# Patient Record
Sex: MALE | Race: BLACK OR AFRICAN AMERICAN | NOT HISPANIC OR LATINO | URBAN - METROPOLITAN AREA
[De-identification: names, ages, dates, MRNs, and addresses within clinical notes are randomized per-mention and may not be internally consistent; named-entity substitution may affect disease eponyms.]

---

## 2019-04-13 ENCOUNTER — INPATIENT (INPATIENT)
Facility: HOSPITAL | Age: 84
LOS: 3 days | Discharge: HOME | DRG: 202 | End: 2019-04-17
Attending: STUDENT IN AN ORGANIZED HEALTH CARE EDUCATION/TRAINING PROGRAM | Admitting: STUDENT IN AN ORGANIZED HEALTH CARE EDUCATION/TRAINING PROGRAM
Payer: MEDICARE

## 2019-04-13 VITALS
DIASTOLIC BLOOD PRESSURE: 83 MMHG | HEART RATE: 89 BPM | SYSTOLIC BLOOD PRESSURE: 151 MMHG | OXYGEN SATURATION: 94 % | TEMPERATURE: 98 F | RESPIRATION RATE: 23 BRPM

## 2019-04-13 DIAGNOSIS — G80.9 CEREBRAL PALSY, UNSPECIFIED: ICD-10-CM

## 2019-04-13 DIAGNOSIS — L89.90 PRESSURE ULCER OF UNSPECIFIED SITE, UNSPECIFIED STAGE: ICD-10-CM

## 2019-04-13 DIAGNOSIS — Z91.89 OTHER SPECIFIED PERSONAL RISK FACTORS, NOT ELSEWHERE CLASSIFIED: ICD-10-CM

## 2019-04-13 DIAGNOSIS — D50.9 IRON DEFICIENCY ANEMIA, UNSPECIFIED: ICD-10-CM

## 2019-04-13 DIAGNOSIS — R63.8 OTHER SYMPTOMS AND SIGNS CONCERNING FOOD AND FLUID INTAKE: ICD-10-CM

## 2019-04-13 DIAGNOSIS — I10 ESSENTIAL (PRIMARY) HYPERTENSION: ICD-10-CM

## 2019-04-13 DIAGNOSIS — J45.21 MILD INTERMITTENT ASTHMA WITH (ACUTE) EXACERBATION: ICD-10-CM

## 2019-04-13 LAB
ALBUMIN SERPL ELPH-MCNC: 3.3 G/DL — SIGNIFICANT CHANGE UP (ref 3.3–5)
ALP SERPL-CCNC: 81 U/L — SIGNIFICANT CHANGE UP (ref 40–120)
ALT FLD-CCNC: 10 U/L — SIGNIFICANT CHANGE UP (ref 10–45)
ANION GAP SERPL CALC-SCNC: 12 MMOL/L — SIGNIFICANT CHANGE UP (ref 5–17)
APTT BLD: 33.6 SEC — SIGNIFICANT CHANGE UP (ref 27.5–36.3)
AST SERPL-CCNC: 16 U/L — SIGNIFICANT CHANGE UP (ref 10–40)
BASE EXCESS BLDV CALC-SCNC: 3.8 MMOL/L — SIGNIFICANT CHANGE UP
BASOPHILS # BLD AUTO: 0.03 K/UL — SIGNIFICANT CHANGE UP (ref 0–0.2)
BASOPHILS NFR BLD AUTO: 0.4 % — SIGNIFICANT CHANGE UP (ref 0–2)
BILIRUB SERPL-MCNC: 0.3 MG/DL — SIGNIFICANT CHANGE UP (ref 0.2–1.2)
BUN SERPL-MCNC: 20 MG/DL — SIGNIFICANT CHANGE UP (ref 7–23)
CALCIUM SERPL-MCNC: 9.2 MG/DL — SIGNIFICANT CHANGE UP (ref 8.4–10.5)
CHLORIDE SERPL-SCNC: 104 MMOL/L — SIGNIFICANT CHANGE UP (ref 96–108)
CO2 SERPL-SCNC: 26 MMOL/L — SIGNIFICANT CHANGE UP (ref 22–31)
CREAT SERPL-MCNC: 0.46 MG/DL — LOW (ref 0.5–1.3)
EOSINOPHIL # BLD AUTO: 0.2 K/UL — SIGNIFICANT CHANGE UP (ref 0–0.5)
EOSINOPHIL NFR BLD AUTO: 2.4 % — SIGNIFICANT CHANGE UP (ref 0–6)
FERRITIN SERPL-MCNC: 104 NG/ML — SIGNIFICANT CHANGE UP (ref 30–400)
GAS PNL BLDV: SIGNIFICANT CHANGE UP
GLUCOSE SERPL-MCNC: 95 MG/DL — SIGNIFICANT CHANGE UP (ref 70–99)
HCO3 BLDV-SCNC: 31 MMOL/L — HIGH (ref 20–27)
HCT VFR BLD CALC: 37.3 % — LOW (ref 39–50)
HGB BLD-MCNC: 11.6 G/DL — LOW (ref 13–17)
IMM GRANULOCYTES NFR BLD AUTO: 0.2 % — SIGNIFICANT CHANGE UP (ref 0–1.5)
INR BLD: 1.16 — SIGNIFICANT CHANGE UP (ref 0.88–1.16)
IRON SATN MFR SERPL: 15 UG/DL — LOW (ref 45–165)
IRON SATN MFR SERPL: 7 % — LOW (ref 16–55)
LYMPHOCYTES # BLD AUTO: 1.54 K/UL — SIGNIFICANT CHANGE UP (ref 1–3.3)
LYMPHOCYTES # BLD AUTO: 18.7 % — SIGNIFICANT CHANGE UP (ref 13–44)
MAGNESIUM SERPL-MCNC: 2 MG/DL — SIGNIFICANT CHANGE UP (ref 1.6–2.6)
MCHC RBC-ENTMCNC: 25.8 PG — LOW (ref 27–34)
MCHC RBC-ENTMCNC: 31.1 GM/DL — LOW (ref 32–36)
MCV RBC AUTO: 83.1 FL — SIGNIFICANT CHANGE UP (ref 80–100)
MONOCYTES # BLD AUTO: 0.71 K/UL — SIGNIFICANT CHANGE UP (ref 0–0.9)
MONOCYTES NFR BLD AUTO: 8.6 % — SIGNIFICANT CHANGE UP (ref 2–14)
NEUTROPHILS # BLD AUTO: 5.74 K/UL — SIGNIFICANT CHANGE UP (ref 1.8–7.4)
NEUTROPHILS NFR BLD AUTO: 69.7 % — SIGNIFICANT CHANGE UP (ref 43–77)
NRBC # BLD: 0 /100 WBCS — SIGNIFICANT CHANGE UP (ref 0–0)
NT-PROBNP SERPL-SCNC: 148 PG/ML — SIGNIFICANT CHANGE UP (ref 0–300)
PCO2 BLDV: 60 MMHG — HIGH (ref 41–51)
PH BLDV: 7.33 — SIGNIFICANT CHANGE UP (ref 7.32–7.43)
PLATELET # BLD AUTO: 210 K/UL — SIGNIFICANT CHANGE UP (ref 150–400)
PO2 BLDV: 41 MMHG — SIGNIFICANT CHANGE UP
POTASSIUM SERPL-MCNC: 3.6 MMOL/L — SIGNIFICANT CHANGE UP (ref 3.5–5.3)
POTASSIUM SERPL-SCNC: 3.6 MMOL/L — SIGNIFICANT CHANGE UP (ref 3.5–5.3)
PROT SERPL-MCNC: 7.5 G/DL — SIGNIFICANT CHANGE UP (ref 6–8.3)
PROTHROM AB SERPL-ACNC: 13.2 SEC — HIGH (ref 10–12.9)
RBC # BLD: 4.49 M/UL — SIGNIFICANT CHANGE UP (ref 4.2–5.8)
RBC # FLD: 17.2 % — HIGH (ref 10.3–14.5)
SAO2 % BLDV: 71 % — SIGNIFICANT CHANGE UP
SODIUM SERPL-SCNC: 142 MMOL/L — SIGNIFICANT CHANGE UP (ref 135–145)
TIBC SERPL-MCNC: 218 UG/DL — LOW (ref 220–430)
TROPONIN T SERPL-MCNC: 0.03 NG/ML — HIGH (ref 0–0.01)
TROPONIN T SERPL-MCNC: 0.04 NG/ML — CRITICAL HIGH (ref 0–0.01)
UIBC SERPL-MCNC: 203 UG/DL — SIGNIFICANT CHANGE UP (ref 110–370)
WBC # BLD: 8.24 K/UL — SIGNIFICANT CHANGE UP (ref 3.8–10.5)
WBC # FLD AUTO: 8.24 K/UL — SIGNIFICANT CHANGE UP (ref 3.8–10.5)

## 2019-04-13 PROCEDURE — 71045 X-RAY EXAM CHEST 1 VIEW: CPT | Mod: 26

## 2019-04-13 PROCEDURE — 99285 EMERGENCY DEPT VISIT HI MDM: CPT | Mod: 25

## 2019-04-13 PROCEDURE — 99223 1ST HOSP IP/OBS HIGH 75: CPT | Mod: GC

## 2019-04-13 PROCEDURE — 93010 ELECTROCARDIOGRAM REPORT: CPT

## 2019-04-13 RX ORDER — IPRATROPIUM/ALBUTEROL SULFATE 18-103MCG
3 AEROSOL WITH ADAPTER (GRAM) INHALATION EVERY 6 HOURS
Qty: 0 | Refills: 0 | Status: DISCONTINUED | OUTPATIENT
Start: 2019-04-13 | End: 2019-04-16

## 2019-04-13 RX ORDER — EPINEPHRINE 0.3 MG/.3ML
3 INJECTION INTRAMUSCULAR; SUBCUTANEOUS
Qty: 0 | Refills: 0 | COMMUNITY

## 2019-04-13 RX ORDER — IPRATROPIUM BROMIDE 0.2 MG/ML
0 SOLUTION, NON-ORAL INHALATION
Qty: 0 | Refills: 0 | COMMUNITY

## 2019-04-13 RX ORDER — IPRATROPIUM/ALBUTEROL SULFATE 18-103MCG
3 AEROSOL WITH ADAPTER (GRAM) INHALATION
Qty: 0 | Refills: 0 | Status: COMPLETED | OUTPATIENT
Start: 2019-04-13 | End: 2019-04-13

## 2019-04-13 RX ORDER — HEPARIN SODIUM 5000 [USP'U]/ML
5000 INJECTION INTRAVENOUS; SUBCUTANEOUS EVERY 8 HOURS
Qty: 0 | Refills: 0 | Status: DISCONTINUED | OUTPATIENT
Start: 2019-04-13 | End: 2019-04-17

## 2019-04-13 RX ORDER — FERROUS SULFATE 325(65) MG
325 TABLET ORAL EVERY 8 HOURS
Qty: 0 | Refills: 0 | Status: DISCONTINUED | OUTPATIENT
Start: 2019-04-13 | End: 2019-04-17

## 2019-04-13 RX ORDER — ASPIRIN/CALCIUM CARB/MAGNESIUM 324 MG
81 TABLET ORAL DAILY
Qty: 0 | Refills: 0 | Status: DISCONTINUED | OUTPATIENT
Start: 2019-04-13 | End: 2019-04-17

## 2019-04-13 RX ORDER — FERROUS SULFATE 325(65) MG
325 TABLET ORAL DAILY
Qty: 0 | Refills: 0 | Status: DISCONTINUED | OUTPATIENT
Start: 2019-04-13 | End: 2019-04-13

## 2019-04-13 RX ORDER — AMLODIPINE BESYLATE 2.5 MG/1
5 TABLET ORAL DAILY
Qty: 0 | Refills: 0 | Status: DISCONTINUED | OUTPATIENT
Start: 2019-04-13 | End: 2019-04-17

## 2019-04-13 RX ORDER — ASPIRIN/CALCIUM CARB/MAGNESIUM 324 MG
1 TABLET ORAL
Qty: 0 | Refills: 0 | COMMUNITY

## 2019-04-13 RX ORDER — ASCORBIC ACID 60 MG
1 TABLET,CHEWABLE ORAL
Qty: 0 | Refills: 0 | COMMUNITY

## 2019-04-13 RX ADMIN — HEPARIN SODIUM 5000 UNIT(S): 5000 INJECTION INTRAVENOUS; SUBCUTANEOUS at 22:03

## 2019-04-13 RX ADMIN — Medication 325 MILLIGRAM(S): at 11:02

## 2019-04-13 RX ADMIN — Medication 3 MILLILITER(S): at 11:02

## 2019-04-13 RX ADMIN — Medication 325 MILLIGRAM(S): at 17:22

## 2019-04-13 RX ADMIN — Medication 3 MILLILITER(S): at 22:01

## 2019-04-13 RX ADMIN — AMLODIPINE BESYLATE 5 MILLIGRAM(S): 2.5 TABLET ORAL at 22:02

## 2019-04-13 RX ADMIN — Medication 3 MILLILITER(S): at 17:28

## 2019-04-13 RX ADMIN — Medication 3 MILLILITER(S): at 05:36

## 2019-04-13 RX ADMIN — Medication 3 MILLILITER(S): at 06:22

## 2019-04-13 RX ADMIN — Medication 325 MILLIGRAM(S): at 22:02

## 2019-04-13 RX ADMIN — Medication 81 MILLIGRAM(S): at 11:02

## 2019-04-13 RX ADMIN — Medication 1 TABLET(S): at 11:02

## 2019-04-13 RX ADMIN — Medication 125 MILLIGRAM(S): at 05:39

## 2019-04-13 RX ADMIN — HEPARIN SODIUM 5000 UNIT(S): 5000 INJECTION INTRAVENOUS; SUBCUTANEOUS at 13:19

## 2019-04-13 NOTE — H&P ADULT - ASSESSMENT
84 yo M with h/o as above presenting with c/o SOB for the last few days after running out of asthma medications.   Admitted to medicine for Asthma exacerbation 2/2 running out of medicatiosn. 82 yo M with h/o as above presenting with c/o SOB for the last few days after running out of asthma medications.   Admitted to medicine for Asthma exacerbation 2/2 running out of medication.

## 2019-04-13 NOTE — H&P ADULT - PROBLEM SELECTOR PLAN 1
Pt with h/o Asthma/COPD overlap presenting with c/o SOB after running out of bronchodilators.   -received duonebs with Solumedrol and was placed on Bipap.  -Duonebs standing and PRN  -Bipap at night time.

## 2019-04-13 NOTE — ED ADULT NURSE NOTE - OBJECTIVE STATEMENT
Presents to ED for SOB.  Patient was brought in by his sister who reports she was concerned by his breathing.  She reports he ran out of his albuterol/atrovent one day ago.  Patient wears CPAP at night per sister, and has been placed on BiPAP in the ED.  She states he has had a cough but denies any fevers, chills, sick contacts.  Patient is nonverbal but nods appropriately to questions asked.

## 2019-04-13 NOTE — ED PROVIDER NOTE - OBJECTIVE STATEMENT
83M hx CP, dementia, htn, copd/asthma, brought in by sister for SOB. states ran out of albuterol/atrovent a day ago.  +wheezing, increased work of breathing.  uses bipap at night.  +cough. no fevers. no vomiting. pt nonverbal at baseline.

## 2019-04-13 NOTE — H&P ADULT - HISTORY OF PRESENT ILLNESS
84 yo M with h/o CP, Dementia, HTN, COPD/Asthma presents with 82 yo M with h/o CP, Dementia, HTN, COPD/Asthma presenting with sister with c/o SOB for the last few days after running out of Albuterol/atrovent at home.  As per sister he was wheezy and was exhibiting signs of increased WOB. Pt at home uses Bipap at night. Also endorsing cough.   Pt has h/o intubations in the past, last time was mid 2018. Pt used to be a smoker (how much sister does not know).  Sister denying fevers.     Pt ROS limited: Denies CP, palpitations, abdominal pain, blurry vision.   Pt is DNR/DNI. As per sister is being transfered to a facility in [place], [place] on the 29th.

## 2019-04-13 NOTE — ED ADULT TRIAGE NOTE - ARRIVAL INFO ADDITIONAL COMMENTS
Pt Brought in by wheelchair with sister, AAA+O person and place. He is unable to speak but can mouth his words, states he is short of breath, chest tightness using his accesory muscles. Onset was last night after he finished his last asthma nebulizer. Hx of COPD and asthma. He uses bipap every night. Sister denies fever, chills, cough. HX of dementia, cerebral palsy, HTN. Hx of

## 2019-04-13 NOTE — H&P ADULT - NSICDXPASTMEDICALHX_GEN_ALL_CORE_FT
PAST MEDICAL HISTORY:  Asthma     Cerebral palsy     COPD (chronic obstructive pulmonary disease)     HTN (hypertension)

## 2019-04-13 NOTE — H&P ADULT - ATTENDING COMMENTS
SOB due to running out of albuterol inhaler, not COPD or Asthma exacerbation  C/w nebs ATC for now, no need to continue with steroids  PT eval, anticipate dc home tomorrow/day after  Rest as above

## 2019-04-13 NOTE — ED PROVIDER NOTE - PROGRESS NOTE DETAILS
breathing improved on bipap, likely asthma/copd exacerbatino, will admit to medicine breathing improved on bipap, likely asthma/copd exacerbation, will admit to medicine.  elevated troponin, however no acute st/t wave changes on EKG, likely demand ischemia

## 2019-04-13 NOTE — ED PROVIDER NOTE - PMH
Cerebral palsy    COPD (chronic obstructive pulmonary disease)    HTN (hypertension) Asthma    Cerebral palsy    COPD (chronic obstructive pulmonary disease)    HTN (hypertension)

## 2019-04-13 NOTE — H&P ADULT - NSHPLABSRESULTS_GEN_ALL_CORE
.  LABS:                         11.6   8.24  )-----------( 210      ( 13 Apr 2019 05:36 )             37.3     04-13    142  |  104  |  20  ----------------------------<  95  3.6   |  26  |  0.46<L>    Ca    9.2      13 Apr 2019 05:36  Mg     2.0     04-13    TPro  7.5  /  Alb  3.3  /  TBili  0.3  /  DBili  x   /  AST  16  /  ALT  10  /  AlkPhos  81  04-13    PT/INR - ( 13 Apr 2019 05:36 )   PT: 13.2 sec;   INR: 1.16          PTT - ( 13 Apr 2019 05:36 )  PTT:33.6 sec    CARDIAC MARKERS ( 13 Apr 2019 10:32 )  x     / 0.03 ng/mL / x     / x     / x      CARDIAC MARKERS ( 13 Apr 2019 05:36 )  x     / 0.04 ng/mL / x     / x     / x          Serum Pro-Brain Natriuretic Peptide: 148 pg/mL (04-13 @ 05:36)        RADIOLOGY, EKG & ADDITIONAL TESTS: Reviewed.

## 2019-04-13 NOTE — H&P ADULT - NSHPPHYSICALEXAM_GEN_ALL_CORE
.  VITAL SIGNS:  T(C): 36.7 (04-13-19 @ 14:15), Max: 37 (04-13-19 @ 07:18)  T(F): 98.1 (04-13-19 @ 14:15), Max: 98.6 (04-13-19 @ 07:18)  HR: 90 (04-13-19 @ 14:15) (77 - 90)  BP: 120/69 (04-13-19 @ 14:15) (120/69 - 151/83)  BP(mean): --  RR: 17 (04-13-19 @ 14:15) (17 - 23)  SpO2: 100% (04-13-19 @ 14:15) (94% - 100%)  Wt(kg): --    PHYSICAL EXAM:    Constitutional: WDWN resting comfortably in bed; NAD  Head: NC/AT  Eyes: PERRL, EOMI, anicteric sclera  ENT: no nasal discharge; uvula midline, no oropharyngeal erythema or exudates; MMM  Neck: supple; no JVD or thyromegaly  Respiratory: Good air entry b/l , mild wheezes appreciated.   Cardiac: +S1/S2; RRR; no M/R/G; PMI non-displaced  Gastrointestinal: abdomen soft, NT/ND; no rebound or guarding; +BSx4  Genitourinary: normal external genitalia  Back: spine midline, no bony tenderness or step-offs; no CVAT B/L  Extremities: WWP, no clubbing or cyanosis; no peripheral edema, b/l Pressure ulcers, Rt knee also with ulcer, no discharge or redness.   Musculoskeletal: NROM x4; no joint swelling, tenderness or erythema  Vascular: 2+ radial, femoral, DP/PT pulses B/L  Dermatologic: Stage 3 sacral ulcer  Neurologic: AAOx1; CNII-XII grossly intact; no focal deficits

## 2019-04-14 LAB
ANION GAP SERPL CALC-SCNC: 8 MMOL/L — SIGNIFICANT CHANGE UP (ref 5–17)
BASOPHILS # BLD AUTO: 0.02 K/UL — SIGNIFICANT CHANGE UP (ref 0–0.2)
BASOPHILS NFR BLD AUTO: 0.2 % — SIGNIFICANT CHANGE UP (ref 0–2)
BUN SERPL-MCNC: 23 MG/DL — SIGNIFICANT CHANGE UP (ref 7–23)
CALCIUM SERPL-MCNC: 8.9 MG/DL — SIGNIFICANT CHANGE UP (ref 8.4–10.5)
CHLORIDE SERPL-SCNC: 107 MMOL/L — SIGNIFICANT CHANGE UP (ref 96–108)
CO2 SERPL-SCNC: 27 MMOL/L — SIGNIFICANT CHANGE UP (ref 22–31)
CREAT SERPL-MCNC: 0.55 MG/DL — SIGNIFICANT CHANGE UP (ref 0.5–1.3)
EOSINOPHIL # BLD AUTO: 0.05 K/UL — SIGNIFICANT CHANGE UP (ref 0–0.5)
EOSINOPHIL NFR BLD AUTO: 0.6 % — SIGNIFICANT CHANGE UP (ref 0–6)
GLUCOSE SERPL-MCNC: 83 MG/DL — SIGNIFICANT CHANGE UP (ref 70–99)
HCT VFR BLD CALC: 31.3 % — LOW (ref 39–50)
HGB BLD-MCNC: 9.7 G/DL — LOW (ref 13–17)
IMM GRANULOCYTES NFR BLD AUTO: 0.3 % — SIGNIFICANT CHANGE UP (ref 0–1.5)
LYMPHOCYTES # BLD AUTO: 1.1 K/UL — SIGNIFICANT CHANGE UP (ref 1–3.3)
LYMPHOCYTES # BLD AUTO: 12.8 % — LOW (ref 13–44)
MAGNESIUM SERPL-MCNC: 1.9 MG/DL — SIGNIFICANT CHANGE UP (ref 1.6–2.6)
MCHC RBC-ENTMCNC: 25.7 PG — LOW (ref 27–34)
MCHC RBC-ENTMCNC: 31 GM/DL — LOW (ref 32–36)
MCV RBC AUTO: 83 FL — SIGNIFICANT CHANGE UP (ref 80–100)
MONOCYTES # BLD AUTO: 0.63 K/UL — SIGNIFICANT CHANGE UP (ref 0–0.9)
MONOCYTES NFR BLD AUTO: 7.3 % — SIGNIFICANT CHANGE UP (ref 2–14)
NEUTROPHILS # BLD AUTO: 6.76 K/UL — SIGNIFICANT CHANGE UP (ref 1.8–7.4)
NEUTROPHILS NFR BLD AUTO: 78.8 % — HIGH (ref 43–77)
NRBC # BLD: 0 /100 WBCS — SIGNIFICANT CHANGE UP (ref 0–0)
PHOSPHATE SERPL-MCNC: 3.1 MG/DL — SIGNIFICANT CHANGE UP (ref 2.5–4.5)
PLATELET # BLD AUTO: 164 K/UL — SIGNIFICANT CHANGE UP (ref 150–400)
POTASSIUM SERPL-MCNC: 4.1 MMOL/L — SIGNIFICANT CHANGE UP (ref 3.5–5.3)
POTASSIUM SERPL-SCNC: 4.1 MMOL/L — SIGNIFICANT CHANGE UP (ref 3.5–5.3)
RBC # BLD: 3.77 M/UL — LOW (ref 4.2–5.8)
RBC # FLD: 17.2 % — HIGH (ref 10.3–14.5)
SODIUM SERPL-SCNC: 142 MMOL/L — SIGNIFICANT CHANGE UP (ref 135–145)
WBC # BLD: 8.59 K/UL — SIGNIFICANT CHANGE UP (ref 3.8–10.5)
WBC # FLD AUTO: 8.59 K/UL — SIGNIFICANT CHANGE UP (ref 3.8–10.5)

## 2019-04-14 PROCEDURE — 71045 X-RAY EXAM CHEST 1 VIEW: CPT | Mod: 26

## 2019-04-14 PROCEDURE — 99233 SBSQ HOSP IP/OBS HIGH 50: CPT

## 2019-04-14 RX ORDER — MAGNESIUM SULFATE 500 MG/ML
2 VIAL (ML) INJECTION ONCE
Qty: 0 | Refills: 0 | Status: COMPLETED | OUTPATIENT
Start: 2019-04-14 | End: 2019-04-14

## 2019-04-14 RX ORDER — IPRATROPIUM/ALBUTEROL SULFATE 18-103MCG
3 AEROSOL WITH ADAPTER (GRAM) INHALATION ONCE
Qty: 0 | Refills: 0 | Status: COMPLETED | OUTPATIENT
Start: 2019-04-14 | End: 2019-04-14

## 2019-04-14 RX ADMIN — HEPARIN SODIUM 5000 UNIT(S): 5000 INJECTION INTRAVENOUS; SUBCUTANEOUS at 06:45

## 2019-04-14 RX ADMIN — HEPARIN SODIUM 5000 UNIT(S): 5000 INJECTION INTRAVENOUS; SUBCUTANEOUS at 14:13

## 2019-04-14 RX ADMIN — AMLODIPINE BESYLATE 5 MILLIGRAM(S): 2.5 TABLET ORAL at 06:45

## 2019-04-14 RX ADMIN — Medication 40 MILLIGRAM(S): at 10:05

## 2019-04-14 RX ADMIN — Medication 3 MILLILITER(S): at 03:16

## 2019-04-14 RX ADMIN — Medication 3 MILLILITER(S): at 22:59

## 2019-04-14 RX ADMIN — Medication 3 MILLILITER(S): at 10:05

## 2019-04-14 RX ADMIN — Medication 325 MILLIGRAM(S): at 23:04

## 2019-04-14 RX ADMIN — Medication 3 MILLILITER(S): at 09:47

## 2019-04-14 RX ADMIN — Medication 3 MILLILITER(S): at 14:32

## 2019-04-14 RX ADMIN — Medication 325 MILLIGRAM(S): at 14:12

## 2019-04-14 RX ADMIN — Medication 1 TABLET(S): at 14:12

## 2019-04-14 RX ADMIN — Medication 325 MILLIGRAM(S): at 06:45

## 2019-04-14 RX ADMIN — Medication 81 MILLIGRAM(S): at 14:12

## 2019-04-14 RX ADMIN — HEPARIN SODIUM 5000 UNIT(S): 5000 INJECTION INTRAVENOUS; SUBCUTANEOUS at 23:04

## 2019-04-14 RX ADMIN — Medication 150 GRAM(S): at 10:05

## 2019-04-14 RX ADMIN — Medication 3 MILLILITER(S): at 18:44

## 2019-04-14 NOTE — PHYSICAL THERAPY INITIAL EVALUATION ADULT - ADDITIONAL COMMENTS
Pt is cared for by his sister. As per notes, pt will be transferred to a facility in [place] in ~2 weeks.

## 2019-04-14 NOTE — PHYSICAL THERAPY INITIAL EVALUATION ADULT - ACTIVE RANGE OF MOTION EXAMINATION, REHAB EVAL
bilateral upper extremity Active ROM was WFL (within functional limits)/pt is unable to move his BLE due to spasticity - pt is able to minimally wiggle his toes

## 2019-04-14 NOTE — DIETITIAN INITIAL EVALUATION ADULT. - ENERGY NEEDS
Height: 5'2" Weight: 118lbs, IBW 5'2lbs+/-10%, %IBW 99%, BMI 21.5  ABW used for calculations as pt between % of IBW.   Nutrient needs based on [facility] standards of care for maintenance in older adults.   Needs adjusted for age, stage 3 pressure ulcer, limited mobility 2/2 cerebral palsy

## 2019-04-14 NOTE — PROGRESS NOTE ADULT - SUBJECTIVE AND OBJECTIVE BOX
Patient is a 83y old  Male who presents with a chief complaint of SOB (2019 10:25)      INTERVAL HPI/OVERNIGHT EVENTS:  Seen this afternoon. Events noted: pt was wheezing towards the end when patient was working with PT. Given solumedrol and nebs with improvement of symptoms.  Sister at bedside at time of visit. Patient stated feeling a bit better.    Review of Systems: 12 point review of systems otherwise negative    MEDICATIONS  (STANDING):  ALBUTerol/ipratropium for Nebulization 3 milliLiter(s) Nebulizer every 6 hours  amLODIPine   Tablet 5 milliGRAM(s) Oral daily  aspirin enteric coated 81 milliGRAM(s) Oral daily  ferrous    sulfate 325 milliGRAM(s) Oral every 8 hours  heparin  Injectable 5000 Unit(s) SubCutaneous every 8 hours  multivitamin 1 Tablet(s) Oral daily    MEDICATIONS  (PRN):  ALBUTerol/ipratropium for Nebulization 3 milliLiter(s) Nebulizer every 6 hours PRN Shortness of Breath and/or Wheezing      Allergies    Spiriva (Anaphylaxis)    Intolerances          Vital Signs Last 24 Hrs  T(C): 36.7 (2019 09:07), Max: 36.7 (2019 20:30)  T(F): 98 (2019 09:07), Max: 98.1 (2019 20:30)  HR: 99 (2019 09:07) (69 - 99)  BP: 164/96 (2019 09:07) (128/70 - 164/96)  BP(mean): --  RR: 18 (2019 09:07) (16 - 18)  SpO2: 97% (2019 09:07) (97% - 100%)  CAPILLARY BLOOD GLUCOSE            Physical Exam:    Daily     Daily Weight in k (2019 14:12)  General:  Well appearing, NAD, not cachetic  HEENT:  Nonicteric, PERRLA  CV:  RRR, no murmur, no JVD  Lungs:  Minimal wheezes on exam  Abdomen:  Soft, non-tender, no distended, positive BS, no hepatosplenomegaly  Extremities:  2+ pulses, no c/c, no edema  Skin:  Warm and dry, no rashes  :  No lea  Neuro:  Non focal  No Restraints    LABS:                        9.7    8.59  )-----------( 164      ( 2019 06:39 )             31.3     04-    142  |  107  |  23  ----------------------------<  83  4.1   |  27  |  0.55    Ca    8.9      2019 06:39  Phos  3.1     -  Mg     1.9         TPro  7.5  /  Alb  3.3  /  TBili  0.3  /  DBili  x   /  AST  16  /  ALT  10  /  AlkPhos  81  -13    PT/INR - ( 2019 05:36 )   PT: 13.2 sec;   INR: 1.16          PTT - ( 2019 05:36 )  PTT:33.6 sec        RADIOLOGY & ADDITIONAL TESTS:

## 2019-04-14 NOTE — DIETITIAN INITIAL EVALUATION ADULT. - NS AS NUTRI INTERV MEALS SNACK
General/healthful diet/Recommend Pureed CSTCHO, DASH/TLC, w/ Ensure Enlives BID (700kcal, 40g pro). Consider switching to glucerna if blood sugar not well controlled.

## 2019-04-14 NOTE — DIETITIAN INITIAL EVALUATION ADULT. - PERTINENT LABORATORY DATA
Patient taking any blood thinners ? No     Heart disease ? Denies     Lung disease ? Asthma. Advised patient to bring inhaler      Sleep apnea ? Denies     Diabetic ? Denies     Kidney disease ? Denies     Dialysis ? N/a     Electronic implanted medical devices ? Denies     Are you taking any narcotic pain medication ? Patient taking Suboxone  What is your daily dosage ?    PTSD ? N/a     Prep instructions reviewed with patient ? Patient declined review, has had exam before.  policy, [facility] [place] [facility] reviewed. Advised patient to have someone stay with her post exam    Pharmacy : [facility] called to [facility]    Indication for procedure : Screening colonoscopy    Referring provider :Sarah Montemayor APRN CNP on 09/10/18 0910     Arrival Time : 2 PM     TIBC 218, %iron sat 7, total iron 5

## 2019-04-14 NOTE — DIETITIAN INITIAL EVALUATION ADULT. - OTHER INFO
84yo M with h/o as above presenting with c/o SOB for the last few days after running out of asthma medications. Per MD, SOB is d/t running out of albuterol inhalor, not COPD or asthma exacerbation. Pt seen in room, asleep on BiPAP. Currently on a mechanical soft diet, DASH/TLC, CSTCHO diet. Tolerated scrambled eggs for breakfast. Per sister at bedside, pt only has pureed foods at home, when I lifted cover on lunch tray, she stated mechanical soft is not appropriate for him and that he would aspirate. D/w team switching over the pureed. Reports pt has 2 Ensure Original drinks/day at home (440kcal, 18g pro total). Takes vitamin C daily. No apparent GI distress, no N/V, having regular BMs. Endorsed good appetite PTA and w/ breakfast this am. D/w pt's sister increased protein needs d/t stage 3 pressure ulcer. Encouraged pureed protein options (egg salad, tuna fish salad, bean/lentil pureed soups, greek yogurts...etc). NKFA or dietary restrictions. Denies adhering to any therapeutic diet at home. Skin: stage III pressure ulcer on R buttock, GI: fecal incontinence. RD to follow.

## 2019-04-14 NOTE — PROGRESS NOTE ADULT - PROBLEM SELECTOR PLAN 1
Pt with h/o Asthma/COPD overlap presenting with c/o SOB after running out of bronchodilators.   No definite e/o exacerbation but given today's episode will c/w steroids and c/w nebs ATC. Prednisone to be continued for total of 5 days.  BIPAP at bedside, prn/night time.  Wean off oxygen as tolerated.

## 2019-04-14 NOTE — PHYSICAL THERAPY INITIAL EVALUATION ADULT - PERTINENT HX OF CURRENT PROBLEM, REHAB EVAL
82 yo M with h/o as above presenting with c/o SOB for the last few days after running out of asthma medications.

## 2019-04-15 LAB
ANION GAP SERPL CALC-SCNC: 4 MMOL/L — LOW (ref 5–17)
BUN SERPL-MCNC: 25 MG/DL — HIGH (ref 7–23)
CALCIUM SERPL-MCNC: 9 MG/DL — SIGNIFICANT CHANGE UP (ref 8.4–10.5)
CHLORIDE SERPL-SCNC: 108 MMOL/L — SIGNIFICANT CHANGE UP (ref 96–108)
CO2 SERPL-SCNC: 30 MMOL/L — SIGNIFICANT CHANGE UP (ref 22–31)
CREAT SERPL-MCNC: 0.39 MG/DL — LOW (ref 0.5–1.3)
GLUCOSE SERPL-MCNC: 126 MG/DL — HIGH (ref 70–99)
HCT VFR BLD CALC: 30.5 % — LOW (ref 39–50)
HGB BLD-MCNC: 9.4 G/DL — LOW (ref 13–17)
MAGNESIUM SERPL-MCNC: 2.2 MG/DL — SIGNIFICANT CHANGE UP (ref 1.6–2.6)
MCHC RBC-ENTMCNC: 25.5 PG — LOW (ref 27–34)
MCHC RBC-ENTMCNC: 30.8 GM/DL — LOW (ref 32–36)
MCV RBC AUTO: 82.9 FL — SIGNIFICANT CHANGE UP (ref 80–100)
NRBC # BLD: 0 /100 WBCS — SIGNIFICANT CHANGE UP (ref 0–0)
PLATELET # BLD AUTO: 175 K/UL — SIGNIFICANT CHANGE UP (ref 150–400)
POTASSIUM SERPL-MCNC: 5.1 MMOL/L — SIGNIFICANT CHANGE UP (ref 3.5–5.3)
POTASSIUM SERPL-SCNC: 5.1 MMOL/L — SIGNIFICANT CHANGE UP (ref 3.5–5.3)
RBC # BLD: 3.68 M/UL — LOW (ref 4.2–5.8)
RBC # FLD: 17 % — HIGH (ref 10.3–14.5)
SODIUM SERPL-SCNC: 142 MMOL/L — SIGNIFICANT CHANGE UP (ref 135–145)
WBC # BLD: 7.55 K/UL — SIGNIFICANT CHANGE UP (ref 3.8–10.5)
WBC # FLD AUTO: 7.55 K/UL — SIGNIFICANT CHANGE UP (ref 3.8–10.5)

## 2019-04-15 PROCEDURE — 99233 SBSQ HOSP IP/OBS HIGH 50: CPT

## 2019-04-15 RX ADMIN — Medication 40 MILLIGRAM(S): at 21:02

## 2019-04-15 RX ADMIN — Medication 325 MILLIGRAM(S): at 06:08

## 2019-04-15 RX ADMIN — Medication 325 MILLIGRAM(S): at 15:50

## 2019-04-15 RX ADMIN — Medication 3 MILLILITER(S): at 03:05

## 2019-04-15 RX ADMIN — HEPARIN SODIUM 5000 UNIT(S): 5000 INJECTION INTRAVENOUS; SUBCUTANEOUS at 06:08

## 2019-04-15 RX ADMIN — HEPARIN SODIUM 5000 UNIT(S): 5000 INJECTION INTRAVENOUS; SUBCUTANEOUS at 21:02

## 2019-04-15 RX ADMIN — Medication 1 TABLET(S): at 11:23

## 2019-04-15 RX ADMIN — Medication 81 MILLIGRAM(S): at 11:23

## 2019-04-15 RX ADMIN — Medication 3 MILLILITER(S): at 15:50

## 2019-04-15 RX ADMIN — AMLODIPINE BESYLATE 5 MILLIGRAM(S): 2.5 TABLET ORAL at 06:08

## 2019-04-15 RX ADMIN — Medication 3 MILLILITER(S): at 12:25

## 2019-04-15 RX ADMIN — Medication 40 MILLIGRAM(S): at 15:50

## 2019-04-15 RX ADMIN — Medication 3 MILLILITER(S): at 21:01

## 2019-04-15 RX ADMIN — Medication 3 MILLILITER(S): at 06:17

## 2019-04-15 RX ADMIN — HEPARIN SODIUM 5000 UNIT(S): 5000 INJECTION INTRAVENOUS; SUBCUTANEOUS at 13:35

## 2019-04-15 RX ADMIN — Medication 40 MILLIGRAM(S): at 00:37

## 2019-04-15 RX ADMIN — Medication 3 MILLILITER(S): at 09:33

## 2019-04-15 RX ADMIN — Medication 325 MILLIGRAM(S): at 21:02

## 2019-04-15 NOTE — PROGRESS NOTE ADULT - ATTENDING COMMENTS
a/  copd vs asthma exacerbation  Cerebral palsy  dementia  HTN    p/  pt noted to be having increased WOB, placed back on bipap this afternoon.   solumedrol 40 IV q8  bronchodilators q4  bipap qhs and PRN  will consider pulm consult

## 2019-04-15 NOTE — PROGRESS NOTE ADULT - PROBLEM SELECTOR PLAN 1
Pt with h/o Asthma/COPD overlap  presenting with c/o SOB after running out of bronchodilators. s/p Bipap , IV steriods  and mag. Weaned off of bipap  -Duonebs standing and PRN  -Bipap at night time.  -sister is to dispo [place]

## 2019-04-15 NOTE — CONSULT NOTE ADULT - ASSESSMENT
84 yo M with h/o as above presenting with c/o SOB for the last few days after running out of asthma medications.   Admitted to medicine for Asthma exacerbation 2/2 running out of medication.     Problem/Plan - 1:  ·  Problem: Exacerbation of intermittent asthma, unspecified asthma severity.  Plan: Pt with h/o Asthma/COPD overlap  presenting with c/o SOB after running out of bronchodilators. s/p Bipap , IV steriods  and mag. Weaned off of bipap  -Duonebs standing and PRN  -Bipap at night time.  -sister is to dispo patient to nursing home [place].     Problem/Plan - 2:  ·  Problem: HTN (hypertension).  Plan: Pt with h/o HTN  Norvasc 5mg daily.     Problem/Plan - 3:  ·  Problem: Cerebral palsy.  Plan: Pt with h/o CP.     Problem/Plan - 4:  ·  Problem: Iron deficiency anemia.  Plan: Pt with labs consistent with ANTONIO.   Ferrous Sulfate 325mg tid.     Problem/Plan - 5:  ·  Problem: Pressure ulcer.  Plan: Pt with pressures sores from being bed ridden.  Wound care consult f/u recs.

## 2019-04-15 NOTE — CONSULT NOTE ADULT - SUBJECTIVE AND OBJECTIVE BOX
Patient is a 83y old  Male who presents with a chief complaint of SOB (15 Apr 2019 10:47)       HPI:  84 yo M with h/o CP, Dementia, HTN, COPD/Asthma presenting with sister with c/o SOB for the last few days after running out of Albuterol/atrovent at home.  As per sister he was wheezy and was exhibiting signs of increased WOB. Pt at home uses Bipap at night. Also endorsing cough.   Pt has h/o intubations in the past, last time was mid 2018. Pt used to be a smoker (how much sister does not know).  Sister denying fevers.     Pt ROS limited: Denies CP, palpitations, abdominal pain, blurry vision.   Pt is DNR/DNI. As per sister is being transfered to a facility in [place], [place] on the 29th. (13 Apr 2019 10:25)      PAST MEDICAL & SURGICAL HISTORY:  Asthma  COPD (chronic obstructive pulmonary disease)  HTN (hypertension)  Cerebral palsy  No significant past surgical history      MEDICATIONS  (STANDING):  ALBUTerol/ipratropium for Nebulization 3 milliLiter(s) Nebulizer every 6 hours  amLODIPine   Tablet 5 milliGRAM(s) Oral daily  aspirin enteric coated 81 milliGRAM(s) Oral daily  ferrous    sulfate 325 milliGRAM(s) Oral every 8 hours  heparin  Injectable 5000 Unit(s) SubCutaneous every 8 hours  methylPREDNISolone sodium succinate Injectable 40 milliGRAM(s) IV Push every 8 hours  multivitamin 1 Tablet(s) Oral daily    MEDICATIONS  (PRN):  ALBUTerol/ipratropium for Nebulization 3 milliLiter(s) Nebulizer every 6 hours PRN Shortness of Breath and/or Wheezing      Social History: lives with his sister in an apartment in the [place], no home care services    Functional Level Prior to Admission: dependent in ADL's, non ambulatory    FAMILY HISTORY:      CBC Full  -  ( 15 Apr 2019 06:09 )  WBC Count : 7.55 K/uL  RBC Count : 3.68 M/uL  Hemoglobin : 9.4 g/dL  Hematocrit : 30.5 %  Platelet Count - Automated : 175 K/uL  Mean Cell Volume : 82.9 fl  Mean Cell Hemoglobin : 25.5 pg  Mean Cell Hemoglobin Concentration : 30.8 gm/dL  Auto Neutrophil # : x  Auto Lymphocyte # : x  Auto Monocyte # : x  Auto Eosinophil # : x  Auto Basophil # : x  Auto Neutrophil % : x  Auto Lymphocyte % : x  Auto Monocyte % : x  Auto Eosinophil % : x  Auto Basophil % : x      04-15    142  |  108  |  25<H>  ----------------------------<  126<H>  5.1   |  30  |  0.39<L>    Ca    9.0      15 Apr 2019 06:09  Phos  3.1     04-14  Mg     2.2     04-15              Radiology:    < from: Xray Chest 1 View- PORTABLE-Urgent (04.14.19 @ 10:42) >  EXAM:  XR CHEST PORTABLE URGENT 1V                          PROCEDURE DATE:  04/14/2019          INTERPRETATION:  PORTABLE CHEST X-RAY     HISTORY: dyspnea    PRIOR STUDIES: 4/14/2019    FINDINGS: No change in lung fields. 6 mm nodular opacity right mid zone   without gross change There are no pleural effusions.  The   cardiomediastinal silhouette, bones and soft tissues are unremarkable.   Elevated left hemidiaphragm.    IMPRESSION:  No change            Vital Signs Last 24 Hrs  T(C): 36.8 (15 Apr 2019 09:08), Max: 37.1 (15 Apr 2019 05:53)  T(F): 98.2 (15 Apr 2019 09:08), Max: 98.7 (15 Apr 2019 05:53)  HR: 82 (15 Apr 2019 09:08) (63 - 88)  BP: 119/69 (15 Apr 2019 09:08) (106/53 - 128/67)  BP(mean): --  RR: 20 (15 Apr 2019 09:08) (19 - 26)  SpO2: 94% (15 Apr 2019 09:08) (94% - 100%)    REVIEW OF SYSTEMS:    CONSTITUTIONAL: No fever, weight loss, or fatigue  EYES: No eye pain, visual disturbances, or discharge  ENMT:  No difficulty hearing, tinnitus, vertigo; No sinus or throat pain  NECK: No pain or stiffness  BREASTS: No pain, masses, or nipple discharge  RESPIRATORY: No cough, wheezing, chills or hemoptysis; No shortness of breath  CARDIOVASCULAR: No chest pain, palpitations, dizziness, or leg swelling  GASTROINTESTINAL: No abdominal or epigastric pain. No nausea, vomiting, or hematemesis; No diarrhea or constipation. No melena or hematochezia.  GENITOURINARY: No dysuria, frequency, hematuria, or incontinence  NEUROLOGICAL: No headaches, memory loss, loss of strength, numbness, or tremors  SKIN: No itching, burning, rashes, or lesions   LYMPH NODES: No enlarged glands  ENDOCRINE: No heat or cold intolerance; No hair loss  MUSCULOSKELETAL: No joint pain or swelling; No muscle, back, or extremity pain  PSYCHIATRIC: No depression, anxiety, mood swings, or difficulty sleeping  HEME/LYMPH: No easy bruising, or bleeding gums  ALLERGY AND IMMUNOLOGIC: No hives or eczema  VASCULAR: no swelling, erythema      Physical Exam: 84 yo AA gentleman lying in bed, confused, NAD    Head: normocephalic, atraumatic    Eyes: PERRLA, EOMI, no nystagmus, sclera anicteric    ENT: nasal discharge, uvula midline, no oropharyngeal erythema/exudate    Neck: supple, negative JVD, negative carotid bruits, no thyromegaly    Chest: mild exp wheezes    Cardiovascular: regular rate and rhythm, neg murmurs/rubs/gallops    Abdomen: soft, non distended, non tender, negative rebound/guarding, normal bowel sounds, neg hepatosplenomegaly    Extremities: St 3 sacral decub, LE flexion spasticity sec to CP    :     Neurologic Exam:    Alert and oriented x 2 to person, speech fluent w/o dysarthria     Cranial Nerves:     II:                       pupils equal, round and reactive to light, visual fields intact   III/ IV/VI:            extraocular movements intact, neg nystagmus, ptosis  V:                       facial sensation intact, V1-3 normal  VII:                     face symmetric, no droop, normal eye closure and smile  VIII:                    hearing intact to finger rub bilaterally  IX/ X:                 soft palate rise symmetrical  XI:                      head turning, shoulder shrug normal  XII:                     tongue midline    Motor Exam:    Upper Extremities:     RIght:   no focal weakness               negative drift    Left :    no focal weakness               negative drift    Lower Extremities:                 Right:    unable to assess sec to spasticity    Left:       unable to assess sec to spasticity      Sensory:    intact to LT/PP in all UE/LE dermatomes    DTR:            = biceps/     triceps/     brachioradialis                      = patella/   medial hamstring/ankle                  Gait:  not tested        PM&R Impression:    1) deconditioned  2) LE spasticity/ CP/ paraparesis  3) gait dysfunction        Recommendations:    1) Physical therapy focusing on therapeutic exercises, bed mobility/transfer out of bed evaluation,    2) Anticipated Disposition Plan/Recs: d/c home, plan to be transferred to [facility] in [facility]

## 2019-04-15 NOTE — PROGRESS NOTE ADULT - SUBJECTIVE AND OBJECTIVE BOX
Patient is a 83y old  Male who presents with a chief complaint of SOB (13 Apr 2019 10:25)      INTERVAL HPI/OVERNIGHT EVENTS: No overnight events    Subjective: Patient has no active complaints reports that he feels much better than when he came in        VITAL SIGNS:  T(C): 36.8 (04-15-19 @ 09:08), Max: 37.1 (04-15-19 @ 05:53)  T(F): 98.2 (04-15-19 @ 09:08), Max: 98.7 (04-15-19 @ 05:53)  HR: 82 (04-15-19 @ 09:08) (63 - 88)  BP: 119/69 (04-15-19 @ 09:08) (106/53 - 128/67)  BP(mean): --  RR: 20 (04-15-19 @ 09:08) (19 - 26)  SpO2: 94% (04-15-19 @ 09:08) (94% - 100%)  Wt(kg): --    PHYSICAL EXAM:    Constitutional: WDWN resting comfortably in bed; NAD  Head: NC/AT  Eyes: PERRL, EOMI, anicteric sclera  ENT: no nasal discharge; uvula midline, no oropharyngeal erythema or exudates; MMM  Neck: supple; no JVD or thyromegaly  Respiratory:  mild expiratory wheezes b/l  Cardiac: +S1/S2; RRR; no M/R/G; PMI non-displaced  Gastrointestinal: abdomen soft, NT/ND; no rebound or guarding; +BSx4  Back: spine midline, no bony tenderness or step-offs; no CVAT B/L  Extremities: WWP   Musculoskeletal: arthritic changes B/L  Vascular: 2+ radial, femoral, DP/PT pulses B/L  Neurologic: AAOx2; CNII-XII grossly intact; no focal deficits        LABS:                                   9.4    7.55  )-----------( 175      ( 15 Apr 2019 06:09 )             30.5         CBC Full  -  ( 15 Apr 2019 06:09 )  WBC Count : 7.55 K/uL  RBC Count : 3.68 M/uL  Hemoglobin : 9.4 g/dL  Hematocrit : 30.5 %  Platelet Count - Automated : 175 K/uL  Mean Cell Volume : 82.9 fl  Mean Cell Hemoglobin : 25.5 pg  Mean Cell Hemoglobin Concentration : 30.8 gm/dL  Auto Neutrophil # : x  Auto Lymphocyte # : x  Auto Monocyte # : x  Auto Eosinophil # : x  Auto Basophil # : x  Auto Neutrophil % : x  Auto Lymphocyte % : x  Auto Monocyte % : x  Auto Eosinophil % : x  Auto Basophil % : x

## 2019-04-16 DIAGNOSIS — J44.9 CHRONIC OBSTRUCTIVE PULMONARY DISEASE, UNSPECIFIED: ICD-10-CM

## 2019-04-16 DIAGNOSIS — R06.03 ACUTE RESPIRATORY DISTRESS: ICD-10-CM

## 2019-04-16 PROBLEM — G80.9 CEREBRAL PALSY, UNSPECIFIED: Chronic | Status: ACTIVE | Noted: 2019-04-13

## 2019-04-16 PROBLEM — I10 ESSENTIAL (PRIMARY) HYPERTENSION: Chronic | Status: ACTIVE | Noted: 2019-04-13

## 2019-04-16 PROBLEM — Z00.00 ENCOUNTER FOR PREVENTIVE HEALTH EXAMINATION: Status: ACTIVE | Noted: 2019-04-16

## 2019-04-16 PROBLEM — J45.909 UNSPECIFIED ASTHMA, UNCOMPLICATED: Chronic | Status: ACTIVE | Noted: 2019-04-13

## 2019-04-16 LAB
ANION GAP SERPL CALC-SCNC: 9 MMOL/L — SIGNIFICANT CHANGE UP (ref 5–17)
BUN SERPL-MCNC: 24 MG/DL — HIGH (ref 7–23)
CALCIUM SERPL-MCNC: 8.9 MG/DL — SIGNIFICANT CHANGE UP (ref 8.4–10.5)
CHLORIDE SERPL-SCNC: 106 MMOL/L — SIGNIFICANT CHANGE UP (ref 96–108)
CO2 SERPL-SCNC: 29 MMOL/L — SIGNIFICANT CHANGE UP (ref 22–31)
CREAT SERPL-MCNC: 0.35 MG/DL — LOW (ref 0.5–1.3)
D DIMER BLD IA.RAPID-MCNC: 675 NG/ML DDU — HIGH
GLUCOSE SERPL-MCNC: 114 MG/DL — HIGH (ref 70–99)
HCT VFR BLD CALC: 31 % — LOW (ref 39–50)
HGB BLD-MCNC: 9.5 G/DL — LOW (ref 13–17)
MAGNESIUM SERPL-MCNC: 2.2 MG/DL — SIGNIFICANT CHANGE UP (ref 1.6–2.6)
MCHC RBC-ENTMCNC: 25.6 PG — LOW (ref 27–34)
MCHC RBC-ENTMCNC: 30.6 GM/DL — LOW (ref 32–36)
MCV RBC AUTO: 83.6 FL — SIGNIFICANT CHANGE UP (ref 80–100)
NRBC # BLD: 0 /100 WBCS — SIGNIFICANT CHANGE UP (ref 0–0)
PLATELET # BLD AUTO: 163 K/UL — SIGNIFICANT CHANGE UP (ref 150–400)
POTASSIUM SERPL-MCNC: 4.8 MMOL/L — SIGNIFICANT CHANGE UP (ref 3.5–5.3)
POTASSIUM SERPL-SCNC: 4.8 MMOL/L — SIGNIFICANT CHANGE UP (ref 3.5–5.3)
RBC # BLD: 3.71 M/UL — LOW (ref 4.2–5.8)
RBC # FLD: 17.2 % — HIGH (ref 10.3–14.5)
SODIUM SERPL-SCNC: 144 MMOL/L — SIGNIFICANT CHANGE UP (ref 135–145)
WBC # BLD: 5.44 K/UL — SIGNIFICANT CHANGE UP (ref 3.8–10.5)
WBC # FLD AUTO: 5.44 K/UL — SIGNIFICANT CHANGE UP (ref 3.8–10.5)

## 2019-04-16 PROCEDURE — 71045 X-RAY EXAM CHEST 1 VIEW: CPT | Mod: 26

## 2019-04-16 PROCEDURE — 99233 SBSQ HOSP IP/OBS HIGH 50: CPT

## 2019-04-16 PROCEDURE — 99223 1ST HOSP IP/OBS HIGH 75: CPT | Mod: GC

## 2019-04-16 RX ORDER — DOCUSATE SODIUM 100 MG
100 CAPSULE ORAL
Qty: 0 | Refills: 0 | Status: DISCONTINUED | OUTPATIENT
Start: 2019-04-16 | End: 2019-04-17

## 2019-04-16 RX ORDER — IPRATROPIUM/ALBUTEROL SULFATE 18-103MCG
3 AEROSOL WITH ADAPTER (GRAM) INHALATION EVERY 4 HOURS
Qty: 0 | Refills: 0 | Status: DISCONTINUED | OUTPATIENT
Start: 2019-04-16 | End: 2019-04-17

## 2019-04-16 RX ADMIN — HEPARIN SODIUM 5000 UNIT(S): 5000 INJECTION INTRAVENOUS; SUBCUTANEOUS at 21:07

## 2019-04-16 RX ADMIN — Medication 3 MILLILITER(S): at 15:15

## 2019-04-16 RX ADMIN — AMLODIPINE BESYLATE 5 MILLIGRAM(S): 2.5 TABLET ORAL at 05:54

## 2019-04-16 RX ADMIN — HEPARIN SODIUM 5000 UNIT(S): 5000 INJECTION INTRAVENOUS; SUBCUTANEOUS at 15:14

## 2019-04-16 RX ADMIN — Medication 3 MILLILITER(S): at 18:09

## 2019-04-16 RX ADMIN — Medication 325 MILLIGRAM(S): at 15:14

## 2019-04-16 RX ADMIN — Medication 1 TABLET(S): at 11:22

## 2019-04-16 RX ADMIN — Medication 40 MILLIGRAM(S): at 15:13

## 2019-04-16 RX ADMIN — HEPARIN SODIUM 5000 UNIT(S): 5000 INJECTION INTRAVENOUS; SUBCUTANEOUS at 05:54

## 2019-04-16 RX ADMIN — Medication 325 MILLIGRAM(S): at 21:07

## 2019-04-16 RX ADMIN — Medication 325 MILLIGRAM(S): at 05:54

## 2019-04-16 RX ADMIN — Medication 3 MILLILITER(S): at 05:51

## 2019-04-16 RX ADMIN — Medication 3 MILLILITER(S): at 21:07

## 2019-04-16 RX ADMIN — Medication 3 MILLILITER(S): at 09:03

## 2019-04-16 RX ADMIN — Medication 40 MILLIGRAM(S): at 05:54

## 2019-04-16 RX ADMIN — Medication 81 MILLIGRAM(S): at 11:23

## 2019-04-16 NOTE — CONSULT NOTE ADULT - ASSESSMENT
84 yo M with cerebral palsy, dementia, COPD, and ?tracheobronchomalacia initially presenting on 4/13 from home with complaints of dyspnea.

## 2019-04-16 NOTE — CONSULT NOTE ADULT - PROBLEM SELECTOR RECOMMENDATION 9
Unclear etiology. Patient's lungs are clear to auscultation and he has no oxygen requirement. Upon return to patient's room at a later time, patient was eating his dinner and had significantly more wheezes than when he was assessed 2 hours prior. Paradoxically, he stated that his breathing was pretty good at this time. It is unclear why he has respiratory distress but given his history of obstructive lung disease, would still treat with standing duonebs q4 and systemic steroids. He may have a history of tracheobronchomalcia per sister at bedside, and we advise to get a full PFT with lung volumes, as well as collateral from his care providers to ascertain what his underlying lung/ airway disease is. With an increased cardiac silhouette and bilateral reticular opacities on CXR, fluid overload is possible. However POCUS examination showed primarily A-line patterns in the lungs without effusions, and likely normal systolic function (although poor windows). Would get an official echocardiogram too. POCUS also showed no evidence of DVT and a mildly elevated d-dimer, which is less suggestive of PE, but need to keep on differential given his baseline immobility. He also has an elevated hemidiaphragm that has been there on imaging prior to this admission. This may be related to his cerebral palsy, but would get a sniff test (X-ray) to evaluate for diaphragmatic dysfunction as well.     Recommend  - Prednisone to continue for 5 total days for now  - Duonebs q4h standing  - Speech/ Swallow evaluation  - Echocardiogram  - Sniff test  - Full PFT with lung volumes  - Collateral information from his pulmonologist and PCP in [place]

## 2019-04-16 NOTE — CONSULT NOTE ADULT - PROBLEM SELECTOR RECOMMENDATION 3
As above, can be contributing to his respiratory distress. However less likely given the chronicity seen on imaging. As above, can be contributing to his respiratory distress. However less likely given the chronicity seen on imaging.          Addendum: re-examined on 4/17/19 - workup has been reviewed. Obstructive disease (possibly COPD given extensive smoking hx) is the most likely cause of his acute respiratory symptoms. After completion of the prednisone course, would continue the patient on PRN rescue (albuterol) and put him on a LAMA as his CAT score is 12, with approximately 1-2 exacerbations within the past year per sister at bedside. Will need outpatient follow up in [place]. Case d/w Attending physician As above, can be contributing to his respiratory distress. However less likely given the chronicity seen on imaging.          Addendum: re-examined on 4/17/19 - patient's symptoms have improved and workup has been reviewed thus far. Obstructive disease is the most likely cause of his acute respiratory symptoms. It is unclear whether it is asthma or COPD - the latter seems more likely given extensive smoking hx, but his outpatient meds include symbicort, which is tx more for asthma. Although his CAT score and mMRC are somewhat unreliable 2/2 his dementia and baseline immobility, it is low (CAT 12). Despite that, he seems to use his rescue therapy almost every day. Would add a LAMA and have him on a triple therapy. Sister at bedside has been instructed to count the number of times the patient needs the nebulizer treatment over the next 1-2 weeks until they see his geriatrician and pulmonlogist in [place]. His long-term therapy will have the be re-evaluated then. Case d/w Attending physician As above, can be contributing to his respiratory distress. However less likely given the chronicity seen on imaging.          Addendum: Re-examined on 4/17/19 - patient's symptoms have improved and workup has been reviewed thus far. Obstructive disease is the most likely cause of his acute respiratory symptoms. It is unclear whether it is asthma or COPD - the latter seems more likely given extensive smoking hx, but his outpatient meds include symbicort in the past, which is tx more for asthma (last refilled 2/2018). Although his CAT score and mMRC are somewhat unreliable 2/2 his dementia and baseline immobility, it is low (CAT 12). Despite that, he seems to use his rescue therapy almost every day. Would add a LAMA to her outpatinet regimen. Sister at bedside has been instructed to count the number of times the patient needs the nebulizer treatment over the next 1-2 weeks until they see his geriatrician and pulmonlogist in [place]. His long-term therapy will have the be re-evaluated then. Case d/w Attending physician

## 2019-04-16 NOTE — PROGRESS NOTE ADULT - ATTENDING COMMENTS
a/  copd vs asthma exacerbation  possible pulmonary edema  Cerebral palsy  dementia  HTN    p/  pts work of breathing improved, but pt still remains tachypneic. no wheeze heard   pulm consulted --- will ultrasound lung to eval for b lines.   f/u d dimer, if elevated, would check cta thorax to eval for PE  prednisone qday   bronchodilators q4  bipap qhs and PRN a/  copd vs asthma exacerbation  possible pulmonary edema  Cerebral palsy  dementia  HTN    p/  pts work of breathing improved, but pt still remains tachypneic. no wheeze heard   pulm consulted --- will ultrasound lung to eval for b lines.   check TTE   f/u d dimer, if elevated, would check cta thorax to eval for PE  prednisone qday   bronchodilators q4  bipap qhs and PRN

## 2019-04-16 NOTE — ADVANCED PRACTICE NURSE CONSULT - ASSESSMENT
84 yo M presenting with c/o SOB for the last few days after running out of asthma medications. Admitted to medicine for Asthma exacerbation 2/2 running out of medication. Pt presents with the following wounds:  Left upper buttock-healing stage 2, 1x1 cm  Right buttock-stage 2, 2.5x1.5  Right calf-vascular wound, 9.5x2.5, dry wound bed  Right lateral calf-vascular wound, 1x1 cm  Right heel-unstageable, 1.7x1.5 cm, dry scab  Right inner knee-Stage 3, 3x1.5 cm, pale pink wound bed, no granulation noted, minimal drainage  Left sole of foot with hyperpigmentation, not pressure injury

## 2019-04-16 NOTE — CONSULT NOTE ADULT - PROBLEM SELECTOR RECOMMENDATION 2
History of COPD and asthma but unclear how it was diagnosed. Currently on duonebs only at home. Need collateral information and further characterization of type of obstructive lung disease (if he is obstructed) as above.

## 2019-04-16 NOTE — PROGRESS NOTE ADULT - PROBLEM SELECTOR PLAN 1
Pt with h/o Asthma/COPD overlap  presenting with c/o SOB after running out of bronchodilators. s/p Bipap , IV steriods  and mag. Weaned off of bipap. Patient yesterday became acutely tachypneic was placed back on IV steriods.   -Duonebs standing and PRN  -Bipap at night time.  -sister is to dispo patient to nursing home [place]  -Transition back to oral steriods  -wean O2 Pt with h/o Asthma/COPD overlap  presenting with c/o SOB after running out of bronchodilators. s/p Bipap , IV steriods  and mag. Weaned off of bipap. Patient yesterday became acutely tachypneic was placed back on IV steriods.   -Duonebs standing and PRN  -Bipap at night time.  -sister is to dispo patient to nursing home [place]  -Transition back to oral steriods  -wean O2    #pulmonary nodule  6 MM RUL nodule found on CXR follow as outpatient

## 2019-04-16 NOTE — PROGRESS NOTE ADULT - SUBJECTIVE AND OBJECTIVE BOX
Patient is a 83y old  Male who presents with a chief complaint of SOB (13 Apr 2019 10:25)      INTERVAL HPI/OVERNIGHT EVENTS: No overnight events    Subjective: Patient has no active complaints reports that he can breath comfortably         VITAL SIGNS:  Vital Signs Last 24 Hrs  T(C): 36.6 (16 Apr 2019 05:26), Max: 37 (15 Apr 2019 21:34)  T(F): 97.9 (16 Apr 2019 05:26), Max: 98.6 (15 Apr 2019 21:34)  HR: 84 (16 Apr 2019 09:10) (72 - 87)  BP: 122/65 (16 Apr 2019 05:26) (120/69 - 124/70)  BP(mean): --  RR: 18 (16 Apr 2019 09:10) (18 - 28)  SpO2: 95% (16 Apr 2019 09:10) (94% - 97%)    PHYSICAL EXAM:    Constitutional: WDWN resting comfortably in bed; NAD  Head: NC/AT  Eyes: PERRL, EOMI, anicteric sclera  ENT: no nasal discharge; uvula midline, no oropharyngeal erythema or exudates; MMM  Neck: supple; no JVD or thyromegaly  Respiratory: CTAB  Cardiac: +S1/S2; RRR; no M/R/G; PMI non-displaced  Gastrointestinal: abdomen soft, NT/ND; no rebound or guarding; +BSx4B  Extremities: WWP   Musculoskeletal: arthritic changes B/L  Vascular: 2+ radial, femoral, DP/PT pulses B/L  Neurologic: AAOx2; CNII-XII grossly intact; no focal deficits        LABS:                                      9.5    5.44  )-----------( 163      ( 16 Apr 2019 07:41 )             31.0       04-16    144  |  106  |  24<H>  ----------------------------<  114<H>  4.8   |  29  |  0.35<L>    Ca    8.9      16 Apr 2019 07:41  Mg     2.2     04-16                        Lactate Trend            CAPILLARY BLOOD GLUCOSE                04-15-19 @ 07:01  -  04-16-19 @ 07:00  --------------------------------------------------------  IN: 238 mL / OUT: 400 mL / NET: -162 mL 82 yo M with h/o CP, Dementia, HTN, COPD/Asthma presenting with sister with c/o SOB for the last few days after running out of Albuterol/atrovent at home. As per sister he was wheezy and was exhibiting signs of increased WOB. Pt at home uses Bipap at night. Also endorsing cough.  Patient was admitted , given Mg. Solumedrol stat nebs x 2, and placed on BiPAP. The patient was able to wean off BIPAp and  Started on prednisone 40.  The patient was transitioned from NC to RA, but had another episode of tachypnea, w/o wheezing. Patient was switched back to NC and given IV solumedrol.  The next day patient switched to PO pred however still with signs of labored breathing. Pulm was consulted. D dimer is pending.     Subjective: Patient has no active complaints reports that he can breath comfortably         VITAL SIGNS:  Vital Signs Last 24 Hrs  T(C): 36.6 (16 Apr 2019 05:26), Max: 37 (15 Apr 2019 21:34)  T(F): 97.9 (16 Apr 2019 05:26), Max: 98.6 (15 Apr 2019 21:34)  HR: 84 (16 Apr 2019 09:10) (72 - 87)  BP: 122/65 (16 Apr 2019 05:26) (120/69 - 124/70)  BP(mean): --  RR: 18 (16 Apr 2019 09:10) (18 - 28)  SpO2: 95% (16 Apr 2019 09:10) (94% - 97%)    PHYSICAL EXAM:    Constitutional: WDWN resting comfortably in bed; NAD  Head: NC/AT  Eyes: PERRL, EOMI, anicteric sclera  ENT: no nasal discharge; uvula midline, no oropharyngeal erythema or exudates; MMM  Neck: supple; no JVD or thyromegaly  Respiratory: CTAB  Cardiac: +S1/S2; RRR; no M/R/G; PMI non-displaced  Gastrointestinal: abdomen soft, NT/ND; no rebound or guarding; +BSx4B  Extremities: WWP   Musculoskeletal: arthritic changes B/L  Vascular: 2+ radial, femoral, DP/PT pulses B/L  Neurologic: AAOx2; CNII-XII grossly intact; no focal deficits        LABS:                                      9.5    5.44  )-----------( 163      ( 16 Apr 2019 07:41 )             31.0       04-16    144  |  106  |  24<H>  ----------------------------<  114<H>  4.8   |  29  |  0.35<L>    Ca    8.9      16 Apr 2019 07:41  Mg     2.2     04-16                        Lactate Trend            CAPILLARY BLOOD GLUCOSE                04-15-19 @ 07:01  -  04-16-19 @ 07:00  --------------------------------------------------------  IN: 238 mL / OUT: 400 mL / NET: -162 mL

## 2019-04-16 NOTE — ADVANCED PRACTICE NURSE CONSULT - RECOMMEDATIONS
Recommended moisture barrier ointment to bilat buttocks/sacral area, Medihoney over calf wounds daily, Z-capri boots bilaterally. Spoke with patient's sister re: turning and repositioning every 2 hours to prevent additional pressure injuries. Also spoke with JESSIE Sanchez and house staff.

## 2019-04-17 ENCOUNTER — TRANSCRIPTION ENCOUNTER (OUTPATIENT)
Age: 84
End: 2019-04-17

## 2019-04-17 VITALS
HEART RATE: 83 BPM | OXYGEN SATURATION: 100 % | RESPIRATION RATE: 18 BRPM | DIASTOLIC BLOOD PRESSURE: 76 MMHG | SYSTOLIC BLOOD PRESSURE: 143 MMHG

## 2019-04-17 DIAGNOSIS — J45.21 MILD INTERMITTENT ASTHMA WITH (ACUTE) EXACERBATION: ICD-10-CM

## 2019-04-17 LAB
ANION GAP SERPL CALC-SCNC: 8 MMOL/L — SIGNIFICANT CHANGE UP (ref 5–17)
BUN SERPL-MCNC: 30 MG/DL — HIGH (ref 7–23)
CALCIUM SERPL-MCNC: 9 MG/DL — SIGNIFICANT CHANGE UP (ref 8.4–10.5)
CHLORIDE SERPL-SCNC: 107 MMOL/L — SIGNIFICANT CHANGE UP (ref 96–108)
CO2 SERPL-SCNC: 29 MMOL/L — SIGNIFICANT CHANGE UP (ref 22–31)
CREAT SERPL-MCNC: 0.42 MG/DL — LOW (ref 0.5–1.3)
GLUCOSE SERPL-MCNC: 109 MG/DL — HIGH (ref 70–99)
HCT VFR BLD CALC: 30.4 % — LOW (ref 39–50)
HGB BLD-MCNC: 9.4 G/DL — LOW (ref 13–17)
MCHC RBC-ENTMCNC: 25.5 PG — LOW (ref 27–34)
MCHC RBC-ENTMCNC: 30.9 GM/DL — LOW (ref 32–36)
MCV RBC AUTO: 82.6 FL — SIGNIFICANT CHANGE UP (ref 80–100)
NRBC # BLD: 0 /100 WBCS — SIGNIFICANT CHANGE UP (ref 0–0)
PLATELET # BLD AUTO: 181 K/UL — SIGNIFICANT CHANGE UP (ref 150–400)
POTASSIUM SERPL-MCNC: 4.6 MMOL/L — SIGNIFICANT CHANGE UP (ref 3.5–5.3)
POTASSIUM SERPL-SCNC: 4.6 MMOL/L — SIGNIFICANT CHANGE UP (ref 3.5–5.3)
RBC # BLD: 3.68 M/UL — LOW (ref 4.2–5.8)
RBC # FLD: 17.2 % — HIGH (ref 10.3–14.5)
SODIUM SERPL-SCNC: 144 MMOL/L — SIGNIFICANT CHANGE UP (ref 135–145)
WBC # BLD: 6.32 K/UL — SIGNIFICANT CHANGE UP (ref 3.8–10.5)
WBC # FLD AUTO: 6.32 K/UL — SIGNIFICANT CHANGE UP (ref 3.8–10.5)

## 2019-04-17 PROCEDURE — 93005 ELECTROCARDIOGRAM TRACING: CPT

## 2019-04-17 PROCEDURE — 93306 TTE W/DOPPLER COMPLETE: CPT

## 2019-04-17 PROCEDURE — 84484 ASSAY OF TROPONIN QUANT: CPT

## 2019-04-17 PROCEDURE — 84100 ASSAY OF PHOSPHORUS: CPT

## 2019-04-17 PROCEDURE — 83735 ASSAY OF MAGNESIUM: CPT

## 2019-04-17 PROCEDURE — 94660 CPAP INITIATION&MGMT: CPT

## 2019-04-17 PROCEDURE — 80053 COMPREHEN METABOLIC PANEL: CPT

## 2019-04-17 PROCEDURE — 82803 BLOOD GASES ANY COMBINATION: CPT

## 2019-04-17 PROCEDURE — 85025 COMPLETE CBC W/AUTO DIFF WBC: CPT

## 2019-04-17 PROCEDURE — 83540 ASSAY OF IRON: CPT

## 2019-04-17 PROCEDURE — 83880 ASSAY OF NATRIURETIC PEPTIDE: CPT

## 2019-04-17 PROCEDURE — 97161 PT EVAL LOW COMPLEX 20 MIN: CPT

## 2019-04-17 PROCEDURE — 71045 X-RAY EXAM CHEST 1 VIEW: CPT

## 2019-04-17 PROCEDURE — 76000 FLUOROSCOPY <1 HR PHYS/QHP: CPT | Mod: 26

## 2019-04-17 PROCEDURE — 80048 BASIC METABOLIC PNL TOTAL CA: CPT

## 2019-04-17 PROCEDURE — 92610 EVALUATE SWALLOWING FUNCTION: CPT

## 2019-04-17 PROCEDURE — 36415 COLL VENOUS BLD VENIPUNCTURE: CPT

## 2019-04-17 PROCEDURE — 99239 HOSP IP/OBS DSCHRG MGMT >30: CPT

## 2019-04-17 PROCEDURE — 85379 FIBRIN DEGRADATION QUANT: CPT

## 2019-04-17 PROCEDURE — 93306 TTE W/DOPPLER COMPLETE: CPT | Mod: 26

## 2019-04-17 PROCEDURE — 71275 CT ANGIOGRAPHY CHEST: CPT | Mod: 26

## 2019-04-17 PROCEDURE — 96374 THER/PROPH/DIAG INJ IV PUSH: CPT

## 2019-04-17 PROCEDURE — 94640 AIRWAY INHALATION TREATMENT: CPT

## 2019-04-17 PROCEDURE — 71275 CT ANGIOGRAPHY CHEST: CPT

## 2019-04-17 PROCEDURE — 83550 IRON BINDING TEST: CPT

## 2019-04-17 PROCEDURE — 85730 THROMBOPLASTIN TIME PARTIAL: CPT

## 2019-04-17 PROCEDURE — 99285 EMERGENCY DEPT VISIT HI MDM: CPT | Mod: 25

## 2019-04-17 PROCEDURE — 82728 ASSAY OF FERRITIN: CPT

## 2019-04-17 PROCEDURE — 85027 COMPLETE CBC AUTOMATED: CPT

## 2019-04-17 PROCEDURE — 76000 FLUOROSCOPY <1 HR PHYS/QHP: CPT

## 2019-04-17 PROCEDURE — 85610 PROTHROMBIN TIME: CPT

## 2019-04-17 RX ORDER — UMECLIDINIUM 62.5 UG/1
62.5 AEROSOL, POWDER ORAL
Qty: 1 | Refills: 0 | OUTPATIENT
Start: 2019-04-17 | End: 2019-05-16

## 2019-04-17 RX ORDER — IPRATROPIUM/ALBUTEROL SULFATE 18-103MCG
3 AEROSOL WITH ADAPTER (GRAM) INHALATION
Qty: 300 | Refills: 0 | OUTPATIENT
Start: 2019-04-17 | End: 2019-04-30

## 2019-04-17 RX ORDER — AMLODIPINE BESYLATE 2.5 MG/1
1 TABLET ORAL
Qty: 30 | Refills: 0 | OUTPATIENT
Start: 2019-04-17 | End: 2019-05-16

## 2019-04-17 RX ORDER — IPRATROPIUM BROMIDE 0.2 MG/ML
2 SOLUTION, NON-ORAL INHALATION
Qty: 0 | Refills: 0 | COMMUNITY

## 2019-04-17 RX ORDER — SODIUM CHLORIDE 9 MG/ML
1000 INJECTION INTRAMUSCULAR; INTRAVENOUS; SUBCUTANEOUS
Qty: 0 | Refills: 0 | Status: DISCONTINUED | OUTPATIENT
Start: 2019-04-17 | End: 2019-04-17

## 2019-04-17 RX ORDER — FERROUS SULFATE 325(65) MG
1 TABLET ORAL
Qty: 90 | Refills: 0 | OUTPATIENT
Start: 2019-04-17 | End: 2019-05-16

## 2019-04-17 RX ORDER — AMLODIPINE BESYLATE 2.5 MG/1
1 TABLET ORAL
Qty: 0 | Refills: 0 | COMMUNITY

## 2019-04-17 RX ORDER — FERROUS SULFATE 325(65) MG
1 TABLET ORAL
Qty: 0 | Refills: 0 | COMMUNITY

## 2019-04-17 RX ORDER — ALBUTEROL 90 UG/1
2 AEROSOL, METERED ORAL
Qty: 0 | Refills: 0 | COMMUNITY

## 2019-04-17 RX ADMIN — SODIUM CHLORIDE 50 MILLILITER(S): 9 INJECTION INTRAMUSCULAR; INTRAVENOUS; SUBCUTANEOUS at 15:26

## 2019-04-17 RX ADMIN — Medication 3 MILLILITER(S): at 15:26

## 2019-04-17 RX ADMIN — Medication 100 MILLIGRAM(S): at 18:11

## 2019-04-17 RX ADMIN — Medication 3 MILLILITER(S): at 18:16

## 2019-04-17 RX ADMIN — Medication 3 MILLILITER(S): at 18:11

## 2019-04-17 RX ADMIN — Medication 3 MILLILITER(S): at 10:00

## 2019-04-17 RX ADMIN — HEPARIN SODIUM 5000 UNIT(S): 5000 INJECTION INTRAVENOUS; SUBCUTANEOUS at 15:31

## 2019-04-17 RX ADMIN — Medication 1 TABLET(S): at 15:19

## 2019-04-17 RX ADMIN — Medication 40 MILLIGRAM(S): at 05:07

## 2019-04-17 RX ADMIN — Medication 3 MILLILITER(S): at 02:06

## 2019-04-17 RX ADMIN — Medication 3 MILLILITER(S): at 05:05

## 2019-04-17 RX ADMIN — Medication 81 MILLIGRAM(S): at 15:19

## 2019-04-17 RX ADMIN — Medication 325 MILLIGRAM(S): at 05:07

## 2019-04-17 RX ADMIN — AMLODIPINE BESYLATE 5 MILLIGRAM(S): 2.5 TABLET ORAL at 05:07

## 2019-04-17 RX ADMIN — HEPARIN SODIUM 5000 UNIT(S): 5000 INJECTION INTRAVENOUS; SUBCUTANEOUS at 05:07

## 2019-04-17 RX ADMIN — Medication 325 MILLIGRAM(S): at 18:11

## 2019-04-17 NOTE — CHART NOTE - NSCHARTNOTEFT_GEN_A_CORE
Admitting Diagnosis:   Patient is a 83y old  Male who presents with a chief complaint of SOB (17 Apr 2019 08:45)      PAST MEDICAL & SURGICAL HISTORY:  Asthma  COPD (chronic obstructive pulmonary disease)  HTN (hypertension)  Cerebral palsy  No significant past surgical history      Current Nutrition Order:pureed with CCHO with snack and ensure enlive BID(700kcal and 40gmprotein       PO Intake: Good (%) [   ]  Fair (50-75%) [x   ] Poor (<25%) [   ]As per sister eating"ok".Noted SLP consult.Presently able to eat.Being fed by sister.     GI Issues: none    Pain:none    Skin Integrity:stage 2 and unstageable PU on buttocks    Labs:   04-17    144  |  107  |  30<H>  ----------------------------<  109<H>  4.6   |  29  |  0.42<L>    Ca    9.0      17 Apr 2019 07:46  Mg     2.2     04-16      CAPILLARY BLOOD GLUCOSE          Medications:  MEDICATIONS  (STANDING):  ALBUTerol/ipratropium for Nebulization 3 milliLiter(s) Nebulizer every 4 hours  amLODIPine   Tablet 5 milliGRAM(s) Oral daily  aspirin enteric coated 81 milliGRAM(s) Oral daily  docusate sodium 100 milliGRAM(s) Oral two times a day  ferrous    sulfate 325 milliGRAM(s) Oral every 8 hours  heparin  Injectable 5000 Unit(s) SubCutaneous every 8 hours  multivitamin 1 Tablet(s) Oral daily  predniSONE   Tablet 40 milliGRAM(s) Oral daily  sodium chloride 0.9%. 1000 milliLiter(s) (50 mL/Hr) IV Continuous <Continuous>    MEDICATIONS  (PRN):      Weight:53kg  Daily     Daily no updated weights    Weight Change: no updated weights    Nutrition Focused Physical Exam: Completed [   ]  Not Pertinent [ x  ]  Muscle Wasting- Temporal [   ]  Clavicle/Pectoral [   ]  Shoulder/Deltoid [   ]  Scapula [   ]  Interosseous [   ]  Quadriceps [   ]  Gastrocnemius [   ]  Fat Wasting- Orbital [   ]  Buccal [   ]  Triceps [   ]  Rib [   ]  Suspect [PCM] 2/2 to physical assessment, [poor intake], and [wt loss]; please see malnutrition chart note.    Estimated energy needs: Based on ABW:53kg [id number]:1060kcal and 1.4-1.6gmprotein(PU demands):74.9-86gmprotein and 30-35cc fluids:1605-1872cc fluids  Subjective: 84 y/o male admitted with asthma exacerbation .Aspiration precautions due to compromised respiratory status. Pending follow up by SLP.  Previous Nutrition Diagnosis: Increased nutrient needs r/t increased demand for protein AEB: Stage 3 PU      Active [ x  ]  Resolved [   ]    If resolved, new PES:     Goal :Meet 80% of needs consistently    Recommendations:1/Updated weights    Education: family educated    Risk Level: High [   ] Moderate [ x  ] Low [   ]

## 2019-04-17 NOTE — SWALLOW BEDSIDE ASSESSMENT ADULT - ASR SWALLOW ASPIRATION MONITOR
fever/pneumonia/change of breathing pattern/throat clearing/oral hygiene/position upright (90Y)/cough/gurgly voice/upper respiratory infection

## 2019-04-17 NOTE — DISCHARGE NOTE PROVIDER - NSDCCPCAREPLAN_GEN_ALL_CORE_FT
PRINCIPAL DISCHARGE DIAGNOSIS  Diagnosis: COPD exacerbation  Assessment and Plan of Treatment: You came in with shortness of breath likely due to COPD.      SECONDARY DISCHARGE DIAGNOSES  Diagnosis: HTN (hypertension)  Assessment and Plan of Treatment: Please continue your amlodipine.    Diagnosis: Iron deficiency anemia  Assessment and Plan of Treatment: Please continue your iron supplements for your low red blood cell. PRINCIPAL DISCHARGE DIAGNOSIS  Diagnosis: COPD exacerbation  Assessment and Plan of Treatment: You came in with shortness of breath likely due to COPD. You will be discharged on a new medication called incruse ellipta, which you should take one inhalation once per day. You will also be supplied with duo-nebs which you should use if the shortness of breath is not controlled with the incruse ellipta. Please take note of how many times you use your duo nebs and follow up with Dr. Vargas and a lung doctor in [place].      SECONDARY DISCHARGE DIAGNOSES  Diagnosis: HTN (hypertension)  Assessment and Plan of Treatment: Please continue your amlodipine.    Diagnosis: Iron deficiency anemia  Assessment and Plan of Treatment: Please continue your iron supplements for your low red blood cell. PRINCIPAL DISCHARGE DIAGNOSIS  Diagnosis: COPD exacerbation  Assessment and Plan of Treatment: You came in with shortness of breath likely due to COPD. You will be discharged on a new medication called incruse ellipta, which you should take one inhalation once per day. You will also be supplied with duo-nebs which you should use if the shortness of breath is not controlled with the incruse ellipta. Please take note of how many times you use your duo nebulizer and follow up with Dr. Vargas and a lung doctor in [place]. In addition, you should take prednisone 40mg for 3 additional days.      SECONDARY DISCHARGE DIAGNOSES  Diagnosis: HTN (hypertension)  Assessment and Plan of Treatment: Please continue your amlodipine.    Diagnosis: Iron deficiency anemia  Assessment and Plan of Treatment: Please continue your iron supplements for your low red blood cell. PRINCIPAL DISCHARGE DIAGNOSIS  Diagnosis: COPD exacerbation  Assessment and Plan of Treatment: You came in with shortness of breath likely due to COPD. You will be discharged on a new medication called incruse ellipta, which you should take one inhalation once per day. You will also be supplied with duo-nebs which you should use if the shortness of breath is not controlled with the incruse ellipta. Please take note of how many times you use your duo nebulizer and follow up with Dr. Vargas and a lung doctor in [place]. In addition, you should take prednisone 40mg for 3 additional days. In addition, please use your bipap to sleep overnight at home.      SECONDARY DISCHARGE DIAGNOSES  Diagnosis: HTN (hypertension)  Assessment and Plan of Treatment: Please continue your amlodipine.    Diagnosis: Iron deficiency anemia  Assessment and Plan of Treatment: Please continue your iron supplements for your low red blood cell.

## 2019-04-17 NOTE — PROGRESS NOTE ADULT - ATTENDING COMMENTS
a/  copd vs asthma exacerbation  Cerebral palsy  dementia  HTN    p/  pts work of continues to improve  CTA thorax (-) pna, PE, lung mass.   pulm recs appreciated  TTE w/o e/o of CHF   prednisone qday   bronchodilators q4  bipap qhs

## 2019-04-17 NOTE — DISCHARGE NOTE NURSING/CASE MANAGEMENT/SOCIAL WORK - NSDCDPATPORTLINK_GEN_ALL_CORE
You can access the [facility], offered by [facility], by registering with the following website: [email or web address]

## 2019-04-17 NOTE — PROGRESS NOTE ADULT - PROBLEM SELECTOR PLAN 6
Dash diet mechanical soft.   Heparin SubQ     CODE STATUS: DNR/DNI.

## 2019-04-17 NOTE — DISCHARGE NOTE PROVIDER - PROVIDER TOKENS
FREE:[LAST:[Sam],FIRST:[Man],PHONE:[phone]],FAX:[phone]],ADDRESS:[[address] [place] [address] [address], [place] [address]],FOLLOWUP:[2 weeks]]

## 2019-04-17 NOTE — SWALLOW BEDSIDE ASSESSMENT ADULT - SWALLOW EVAL: DIAGNOSIS
Mild oral phase deficits and suspect pharyngeal dysphagia given s/sx of aspiration w/ thin liquids and hx of dysphagia. Given PMHx of CP, dementia, and COPD, aspiration precautions in place.

## 2019-04-17 NOTE — PROGRESS NOTE ADULT - PROBLEM SELECTOR PLAN 2
Pt with h/o HTN  Norvasc 5mg daily.

## 2019-04-17 NOTE — SWALLOW BEDSIDE ASSESSMENT ADULT - SWALLOW EVAL: RECOMMENDED FEEDING/EATING TECHNIQUES
alternate food with liquid/position upright (90 degrees)/oral hygiene/small sips/bites/allow for swallow between intakes/maintain upright posture during/after eating for 30 mins/no straws

## 2019-04-17 NOTE — PROGRESS NOTE ADULT - PROBLEM SELECTOR PLAN 5
Pt with pressures sores from being bed ridden.  Wound care consult - appreciate recs
Pt with pressures sores from being bed ridden.  Wound care consult f/u recs
Pt with pressures sores from being bed ridden.  Wound care consult f/u recs
Pt with pressures sores from being bed ridden.  Wound care consult pending.

## 2019-04-17 NOTE — PROGRESS NOTE ADULT - PROBLEM SELECTOR PLAN 7
1) PCP Contacted on Admission: (Y/N) --> Name & Phone #: no PCP  2) Date of Contact with PCP:  3) PCP Contacted at Discharge: (Y/N, N/A)  4) Summary of Handoff Given to PCP:   5) Post-Discharge Appointment Date and Location:
1) PCP Contacted on Admission: (Y/N) --> Name & Phone #:  2) Date of Contact with PCP:  3) PCP Contacted at Discharge: (Y/N, N/A)  4) Summary of Handoff Given to PCP:   5) Post-Discharge Appointment Date and Location:
1) PCP Contacted on Admission: (Y/N) --> Name & Phone #:  2) Date of Contact with PCP:  3) PCP Contacted at Discharge: (Y/N, N/A)  4) Summary of Handoff Given to PCP:   5) Post-Discharge Appointment Date and Location:

## 2019-04-17 NOTE — PROGRESS NOTE ADULT - ASSESSMENT
84 yo M with h/o as above presenting with c/o SOB for the last few days after running out of asthma medications.   Admitted to medicine for Asthma exacerbation 2/2 running out of medication.
82 yo M with h/o as above presenting with c/o SOB for the last few days after running out of asthma medications.   Admitted to medicine for Asthma exacerbation 2/2 running out of medication.
82 yo M with h/o as above presenting with c/o SOB for the last few days after running out of asthma medications.   Admitted to medicine for Asthma exacerbation 2/2 running out of medication.
83M with,

## 2019-04-17 NOTE — PROGRESS NOTE ADULT - PROBLEM SELECTOR PROBLEM 1
Exacerbation of intermittent asthma, unspecified asthma severity

## 2019-04-17 NOTE — SWALLOW BEDSIDE ASSESSMENT ADULT - COMMENTS
PMHx cerebral palsy, dementia, COPD, ?tracheobronchomalacia.  Pt's sister reported hx of dysphagia and that pt has been tolerating a puree diet w/ "mostly" nectar thick liquids at home following instrumental swallow eval in the past (unknown date), though pt is not always compliant w/ nectar liquids. Per sister, pt has been hospitalized for PNA in approx past 1-2 years.  Pt attempted to be seen this AM for swallow eval. Pt observed w/ SOB; per MD, swallow eval deferred. Pt w/ improved breathing this afternoon, MD in agreement w/ conducting swallow eval.

## 2019-04-17 NOTE — PROGRESS NOTE ADULT - PROBLEM SELECTOR PLAN 1
Pt with h/o Asthma/COPD overlap presenting with c/o SOB after running out of bronchodilators. s/p Bipap, IV steroids and mag. Weaned off of bipap.  -Duonebs standing and PRN  -Bipap at night time.  -sister is to dispo patient to nursing [place]. before then will go home and will be cared for by family as family refusing services  -Transition back to oral steroids for 5 days (4/16-4/20)  -wean O2  -pulm consulted - f/u sniff test, PFTs w lung volumes, echocardiogram    #pulmonary nodule  6 MM RUL nodule found on CXR follow as outpatient

## 2019-04-17 NOTE — DISCHARGE NOTE PROVIDER - NSDCFUADDAPPT_GEN_ALL_CORE_FT
Please follow up with Dr. Vargas at your scheduled appointment on 4/29/19. Please have him refer you to a pulmonologist.

## 2019-04-17 NOTE — SWALLOW BEDSIDE ASSESSMENT ADULT - SWALLOW EVAL: RECOMMENDED DIET
Recs to continue baseline puree diet w/ nectar thick liquids as tolerated. Education provided to pt and pt's sister regarding aspiration risks w/ thin liquids.

## 2019-04-17 NOTE — DISCHARGE NOTE PROVIDER - CARE PROVIDER_API CALL
Man Vargas  [address], [place] [address]  Phone: [phone]  Fax: [phone]  Follow Up Time: 2 weeks

## 2019-04-17 NOTE — PROGRESS NOTE ADULT - SUBJECTIVE AND OBJECTIVE BOX
OVERNIGHT EVENTS: GREG    SUBJECTIVE: Patient feels stable this morning. Still has some mild SOB when asked specifically on ROS, but otherwise no complaints.    Vital Signs Last 12 Hrs  T(F): 98.4 (04-17-19 @ 05:38), Max: 98.4 (04-17-19 @ 05:38)  HR: 72 (04-17-19 @ 05:38) (72 - 83)  BP: 149/80 (04-17-19 @ 05:38) (149/80 - 149/80)  BP(mean): --  RR: 19 (04-17-19 @ 05:38) (19 - 19)  SpO2: 100% (04-17-19 @ 05:38) (94% - 100%)  I&O's Summary    16 Apr 2019 07:01  -  17 Apr 2019 07:00  --------------------------------------------------------  IN: 0 mL / OUT: 300 mL / NET: -300 mL        PHYSICAL EXAM:  Constitutional: NAD, comfortable in bed.  HEENT: NC/AT, PERRLA, EOMI, no conjunctival pallor or scleral icterus, MMM  Neck: Supple, no JVD  Respiratory: Normal rate, rhythm, depth, effort. CTAB. no wheezes, fine crackles at bases  Cardiovascular: RRR, normal S1 and S2, no m/r/g.   Gastrointestinal: +BS, soft NTND, no guarding or rebound tenderness, no palpable masses   Extremities: wwp; no cyanosis, clubbing or edema.   Vascular: Pulses equal and strong throughout.   Neurological: AAOx3, no CN deficits, strength and sensation intact throughout, contracted UEs 2/2 to CP        LABS:                        9.4    6.32  )-----------( 181      ( 17 Apr 2019 07:46 )             30.4     04-17    144  |  107  |  30<H>  ----------------------------<  109<H>  4.6   |  29  |  0.42<L>    Ca    9.0      17 Apr 2019 07:46  Mg     2.2     04-16            RADIOLOGY & ADDITIONAL TESTS:    MEDICATIONS  (STANDING):  ALBUTerol/ipratropium for Nebulization 3 milliLiter(s) Nebulizer every 4 hours  amLODIPine   Tablet 5 milliGRAM(s) Oral daily  aspirin enteric coated 81 milliGRAM(s) Oral daily  docusate sodium 100 milliGRAM(s) Oral two times a day  ferrous    sulfate 325 milliGRAM(s) Oral every 8 hours  heparin  Injectable 5000 Unit(s) SubCutaneous every 8 hours  multivitamin 1 Tablet(s) Oral daily  predniSONE   Tablet 40 milliGRAM(s) Oral daily    MEDICATIONS  (PRN):

## 2019-04-17 NOTE — SWALLOW BEDSIDE ASSESSMENT ADULT - NS SPL SWALLOW CLINIC TRIAL FT
Mild oral phase deficits c/b prolonged yet functional bolus manipulation, decreased A-P transport, suspect delayed oral transit time. Given trials of thin liquids x3, pt observed w/ wet/gurgly vocal quality and throat clear x3, suggestive of potential s/sx of aspiration. Pt required multiple throat clear attempts to clear wet vocal quality following each trial. Pt did not demonstrate s/sx of aspiration given trials of nectar x5 and puree x5.

## 2019-04-21 RX ORDER — UMECLIDINIUM 62.5 UG/1
62.5 AEROSOL, POWDER ORAL
Qty: 1 | Refills: 0 | OUTPATIENT
Start: 2019-04-21 | End: 2019-05-20

## 2019-04-23 DIAGNOSIS — Z88.8 ALLERGY STATUS TO OTHER DRUGS, MEDICAMENTS AND BIOLOGICAL SUBSTANCES: ICD-10-CM

## 2019-04-23 DIAGNOSIS — I10 ESSENTIAL (PRIMARY) HYPERTENSION: ICD-10-CM

## 2019-04-23 DIAGNOSIS — D50.9 IRON DEFICIENCY ANEMIA, UNSPECIFIED: ICD-10-CM

## 2019-04-23 DIAGNOSIS — L89.312 PRESSURE ULCER OF RIGHT BUTTOCK, STAGE 2: ICD-10-CM

## 2019-04-23 DIAGNOSIS — R91.1 SOLITARY PULMONARY NODULE: ICD-10-CM

## 2019-04-23 DIAGNOSIS — F03.90 UNSPECIFIED DEMENTIA WITHOUT BEHAVIORAL DISTURBANCE: ICD-10-CM

## 2019-04-23 DIAGNOSIS — Z87.891 PERSONAL HISTORY OF NICOTINE DEPENDENCE: ICD-10-CM

## 2019-04-23 DIAGNOSIS — Z66 DO NOT RESUSCITATE: ICD-10-CM

## 2019-04-23 DIAGNOSIS — L89.322 PRESSURE ULCER OF LEFT BUTTOCK, STAGE 2: ICD-10-CM

## 2019-04-23 DIAGNOSIS — J45.901 UNSPECIFIED ASTHMA WITH (ACUTE) EXACERBATION: ICD-10-CM

## 2019-04-23 DIAGNOSIS — J98.6 DISORDERS OF DIAPHRAGM: ICD-10-CM

## 2019-04-23 DIAGNOSIS — L89.893 PRESSURE ULCER OF OTHER SITE, STAGE 3: ICD-10-CM

## 2019-04-23 DIAGNOSIS — L89.610 PRESSURE ULCER OF RIGHT HEEL, UNSTAGEABLE: ICD-10-CM

## 2019-04-23 DIAGNOSIS — J44.9 CHRONIC OBSTRUCTIVE PULMONARY DISEASE, UNSPECIFIED: ICD-10-CM

## 2019-04-23 DIAGNOSIS — R26.9 UNSPECIFIED ABNORMALITIES OF GAIT AND MOBILITY: ICD-10-CM

## 2019-04-23 DIAGNOSIS — L89.153 PRESSURE ULCER OF SACRAL REGION, STAGE 3: ICD-10-CM

## 2019-04-23 DIAGNOSIS — G80.9 CEREBRAL PALSY, UNSPECIFIED: ICD-10-CM

## 2019-05-01 ENCOUNTER — APPOINTMENT (OUTPATIENT)
Dept: INTERNAL MEDICINE | Facility: CLINIC | Age: 84
End: 2019-05-01

## 2019-11-26 NOTE — CONSULT NOTE ADULT - SUBJECTIVE AND OBJECTIVE BOX
INTERVAL HISTORY:  84 yo M with cerebral palsy, dementia, COPD, and ?tracheobronchomalacia initially presenting on 4/13 from home with complaints of dyspnea. He had ran out of duonebs at home and became dyspneic afterwards, which prompted his sister, who is his primary caretaker, to bring him into the hospital. He was admitted and currently being treated for a COPD/ asthma exacerbation, and we are consulted for persistent dyspnea. Patient was seen and examined at bedside. He states that he still feels some shortness of breath, and added that he has difficulty exhaling air and that it "doesn't feel normal."    All other components of the 10 point review of systems is negative aside from those mentioned above.    PAST MEDICAL & SURGICAL HISTORY:  Asthma  COPD (chronic obstructive pulmonary disease)  HTN (hypertension)  Cerebral palsy  No significant past surgical history  PMHx:    FAMILY HISTORY:  No pertinent     Smoking history:  [ ] Current [X] Former [ ] Never      VITAL SIGNS:  ICU Vital Signs Last 24 Hrs  T(C): 36.7 (16 Apr 2019 14:00), Max: 37 (15 Apr 2019 21:34)  T(F): 98.1 (16 Apr 2019 14:00), Max: 98.6 (15 Apr 2019 21:34)  HR: 83 (16 Apr 2019 14:00) (72 - 87)  BP: 125/55 (16 Apr 2019 14:00) (122/65 - 125/55)  BP(mean): --  ABP: --  ABP(mean): --  RR: 19 (16 Apr 2019 14:00) (18 - 20)  SpO2: 93% (16 Apr 2019 14:00) (93% - 97%)        04-15 @ 07:01  -  04-16 @ 07:00  --------------------------------------------------------  IN: 238 mL / OUT: 400 mL / NET: -162 mL    04-16 @ 07:01  -  04-16 @ 17:48  --------------------------------------------------------  IN: 0 mL / OUT: 300 mL / NET: -300 mL      CAPILLARY BLOOD GLUCOSE      PHYSICAL EXAM:  Constitutional: resting comfortably in bed, NAD  HEENT: NC/AT; PERRL, anicteric sclera; no oropharyngeal erythema or exudates; MMM  Neck: supple, no JVD  Respiratory: CTA B/L, no W/R/R; respirations appear non-labored, speaking full sentences  Cardiovascular: +S1/S2, RRR  Gastrointestinal: abdomen soft, NT/ND; +BS x4  Extremities: WWP; no clubbing, cyanosis or edema  Vascular: 2+ radial, DP/PT and femoral pulses B/L      MEDICATIONS:  MEDICATIONS  (STANDING):  ALBUTerol/ipratropium for Nebulization 3 milliLiter(s) Nebulizer every 6 hours  amLODIPine   Tablet 5 milliGRAM(s) Oral daily  aspirin enteric coated 81 milliGRAM(s) Oral daily  docusate sodium 100 milliGRAM(s) Oral two times a day  ferrous    sulfate 325 milliGRAM(s) Oral every 8 hours  heparin  Injectable 5000 Unit(s) SubCutaneous every 8 hours  multivitamin 1 Tablet(s) Oral daily  predniSONE   Tablet 40 milliGRAM(s) Oral daily    MEDICATIONS  (PRN):  ALBUTerol/ipratropium for Nebulization 3 milliLiter(s) Nebulizer every 6 hours PRN Shortness of Breath and/or Wheezing      ALLERGIES:  Allergies    Spiriva (Anaphylaxis)    Intolerances        LABS:                        9.5    5.44  )-----------( 163      ( 16 Apr 2019 07:41 )             31.0     04-16    144  |  106  |  24<H>  ----------------------------<  114<H>  4.8   |  29  |  0.35<L>    Ca    8.9      16 Apr 2019 07:41  Mg     2.2     04-16    RADIOLOGY & ADDITIONAL TESTS:   CXR reviewed: Increased reticular opacities bilaterally in comparison to study from yesterday. Possible left pleural effusion. Slight cardiomegaly INTERVAL HISTORY:  84 yo M with cerebral palsy, dementia, COPD, and ?tracheobronchomalacia initially presenting on 4/13 from home with complaints of dyspnea. He had ran out of duonebs at home and became dyspneic afterwards, which prompted his sister, who is his primary caretaker, to bring him into the hospital. He was admitted and is currently being treated for a COPD/ asthma exacerbation, and we are consulted for persistent dyspnea. Patient was seen and examined at bedside. He states that he still feels some shortness of breath and coughs, and added that he has difficulty exhaling air. When asked to be more detailed, he stated that it "doesn't feel normal."  All other components of the 10 point review of systems is negative aside from those mentioned above.    PAST MEDICAL & SURGICAL HISTORY:  Asthma  COPD (chronic obstructive pulmonary disease)  HTN (hypertension)  Cerebral palsy  No significant past surgical history  PMHx:    FAMILY HISTORY:  No pertinent     Smoking history:  [ ] Current [X] Former [ ] Never      VITAL SIGNS:  ICU Vital Signs Last 24 Hrs  T(C): 36.7 (16 Apr 2019 14:00), Max: 37 (15 Apr 2019 21:34)  T(F): 98.1 (16 Apr 2019 14:00), Max: 98.6 (15 Apr 2019 21:34)  HR: 83 (16 Apr 2019 14:00) (72 - 87)  BP: 125/55 (16 Apr 2019 14:00) (122/65 - 125/55)  BP(mean): --  ABP: --  ABP(mean): --  RR: 19 (16 Apr 2019 14:00) (18 - 20)  SpO2: 93% (16 Apr 2019 14:00) (93% - 97%)        04-15 @ 07:01  -  04-16 @ 07:00  --------------------------------------------------------  IN: 238 mL / OUT: 400 mL / NET: -162 mL    04-16 @ 07:01 - 04-16 @ 17:48  --------------------------------------------------------  IN: 0 mL / OUT: 300 mL / NET: -300 mL      CAPILLARY BLOOD GLUCOSE      PHYSICAL EXAM:  Constitutional: resting comfortably in bed, NAD  HEENT: NC/AT; PERRL, anicteric sclera; no oropharyngeal erythema or exudates; dry oral mucosa  Neck: supple, no JVD  Respiratory: CTA B/L, no W/R/R; respirations appear non-labored, speaking full sentences  Cardiovascular: +S1/S2, RRR  Gastrointestinal: abdomen soft, NT/ND; +BS x4  Extremities: WWP; no clubbing, cyanosis or edema, with chronic skin changes and pressure ulcers along the posterior side of the extremities.   Vascular: 2+ radial pulses, pedal pulses not palpable.      MEDICATIONS:  MEDICATIONS  (STANDING):  ALBUTerol/ipratropium for Nebulization 3 milliLiter(s) Nebulizer every 6 hours  amLODIPine   Tablet 5 milliGRAM(s) Oral daily  aspirin enteric coated 81 milliGRAM(s) Oral daily  docusate sodium 100 milliGRAM(s) Oral two times a day  ferrous    sulfate 325 milliGRAM(s) Oral every 8 hours  heparin  Injectable 5000 Unit(s) SubCutaneous every 8 hours  multivitamin 1 Tablet(s) Oral daily  predniSONE   Tablet 40 milliGRAM(s) Oral daily    MEDICATIONS  (PRN):  ALBUTerol/ipratropium for Nebulization 3 milliLiter(s) Nebulizer every 6 hours PRN Shortness of Breath and/or Wheezing      ALLERGIES:  Allergies    Spiriva (Anaphylaxis)    Intolerances        LABS:                        9.5    5.44  )-----------( 163      ( 16 Apr 2019 07:41 )             31.0     04-16    144  |  106  |  24<H>  ----------------------------<  114<H>  4.8   |  29  |  0.35<L>    Ca    8.9      16 Apr 2019 07:41  Mg     2.2     04-16    RADIOLOGY & ADDITIONAL TESTS:   CXR reviewed: Increased reticular opacities bilaterally in comparison to study from yesterday. Possible left pleural effusion. Slight cardiomegaly No

## 2021-03-08 NOTE — ED ADULT NURSE NOTE - NSFALLRSKASSESSDT_ED_ALL_ED
ON REMOTE TRANSMISSION PT CONTINUES TO HAVE  BETWEEN 2/21/21 AND 2/28/21 PT HAD 1/7/21, PT HAS HAD 3 NEW NST/ 3 NEW VT MONITORED EPISODES DETECTED. EGM SHOWS ST/SVT, LONGEST LASTING 1 MIN AND 17 SECS WITH V RATE 'S BPM.     13-Apr-2019 06:24